# Patient Record
Sex: FEMALE | Race: WHITE | NOT HISPANIC OR LATINO | URBAN - METROPOLITAN AREA
[De-identification: names, ages, dates, MRNs, and addresses within clinical notes are randomized per-mention and may not be internally consistent; named-entity substitution may affect disease eponyms.]

---

## 2024-09-25 ENCOUNTER — INITIAL PRENATAL (OUTPATIENT)
Dept: OBSTETRICS AND GYNECOLOGY | Facility: CLINIC | Age: 29
End: 2024-09-25
Payer: OTHER GOVERNMENT

## 2024-09-25 VITALS — WEIGHT: 160 LBS | DIASTOLIC BLOOD PRESSURE: 60 MMHG | SYSTOLIC BLOOD PRESSURE: 126 MMHG

## 2024-09-25 DIAGNOSIS — Z34.90 PREGNANCY AT EARLY STAGE (HHS-HCC): Primary | ICD-10-CM

## 2024-09-25 LAB
POC APPEARANCE, URINE: CLEAR
POC BILIRUBIN, URINE: NEGATIVE
POC BLOOD, URINE: ABNORMAL
POC COLOR, URINE: YELLOW
POC GLUCOSE, URINE: NEGATIVE MG/DL
POC KETONES, URINE: ABNORMAL MG/DL
POC LEUKOCYTES, URINE: NEGATIVE
POC NITRITE,URINE: NEGATIVE
POC PH, URINE: 7 PH
POC PROTEIN, URINE: NEGATIVE MG/DL
POC SPECIFIC GRAVITY, URINE: 1.02
POC UROBILINOGEN, URINE: 0.2 EU/DL
PREGNANCY TEST URINE, POC: POSITIVE

## 2024-09-25 PROCEDURE — 81003 URINALYSIS AUTO W/O SCOPE: CPT | Mod: QW | Performed by: STUDENT IN AN ORGANIZED HEALTH CARE EDUCATION/TRAINING PROGRAM

## 2024-09-25 PROCEDURE — 81025 URINE PREGNANCY TEST: CPT | Performed by: STUDENT IN AN ORGANIZED HEALTH CARE EDUCATION/TRAINING PROGRAM

## 2024-09-25 PROCEDURE — 0500F INITIAL PRENATAL CARE VISIT: CPT | Performed by: STUDENT IN AN ORGANIZED HEALTH CARE EDUCATION/TRAINING PROGRAM

## 2024-09-25 ASSESSMENT — EDINBURGH POSTNATAL DEPRESSION SCALE (EPDS)
I HAVE BLAMED MYSELF UNNECESSARILY WHEN THINGS WENT WRONG: NO, NEVER
I HAVE FELT SCARED OR PANICKY FOR NO GOOD REASON: NO, NOT AT ALL
I HAVE BEEN ANXIOUS OR WORRIED FOR NO GOOD REASON: NO, NOT AT ALL
I HAVE BEEN SO UNHAPPY THAT I HAVE HAD DIFFICULTY SLEEPING: NOT AT ALL
I HAVE BEEN ABLE TO LAUGH AND SEE THE FUNNY SIDE OF THINGS: AS MUCH AS I ALWAYS COULD
TOTAL SCORE: 0
THINGS HAVE BEEN GETTING ON TOP OF ME: NO, I HAVE BEEN COPING AS WELL AS EVER
THE THOUGHT OF HARMING MYSELF HAS OCCURRED TO ME: NEVER
I HAVE FELT SAD OR MISERABLE: NO, NOT AT ALL
I HAVE BEEN SO UNHAPPY THAT I HAVE BEEN CRYING: NO, NEVER
I HAVE LOOKED FORWARD WITH ENJOYMENT TO THINGS: AS MUCH AS I EVER DID

## 2024-09-25 ASSESSMENT — ENCOUNTER SYMPTOMS
LOSS OF SENSATION IN FEET: 0
DEPRESSION: 0
OCCASIONAL FEELINGS OF UNSTEADINESS: 0

## 2024-09-25 ASSESSMENT — PAIN SCALES - GENERAL: PAINLEVEL_OUTOF10: 0 - NO PAIN

## 2024-09-25 ASSESSMENT — PAIN - FUNCTIONAL ASSESSMENT: PAIN_FUNCTIONAL_ASSESSMENT: 0-10

## 2024-09-25 ASSESSMENT — PATIENT HEALTH QUESTIONNAIRE - PHQ9
1. LITTLE INTEREST OR PLEASURE IN DOING THINGS: NOT AT ALL
2. FEELING DOWN, DEPRESSED OR HOPELESS: NOT AT ALL
SUM OF ALL RESPONSES TO PHQ9 QUESTIONS 1 & 2: 0

## 2024-09-25 NOTE — PROGRESS NOTES
"Pregnancy Confirmation Visit   Subjective   Patient ID [id number]   Saleem Jo \"Rebecca\" is a 29 y.o.  4w1d, 6/3/2025, by today's bedside ultrasound. She is unsure about her LMP but thinks it may be the end of July.     She denies any vaginal bleeding or cramping.    OB History    Para Term  AB Living   1 0 0 0 0 0   SAB IAB Ectopic Multiple Live Births   0 0 0 0 0      # Outcome Date GA Lbr Johnnie/2nd Weight Sex Type Anes PTL Lv   1 Current              Shel[place]  Depression Scale Total: 0    History reviewed. No pertinent past medical history.    History reviewed. No pertinent surgical history.    Family History   Problem Relation Name Age of Onset    Hypertension Mother Genie     Diabetes Father      Breast cancer Father's Sister Clovis     Vision loss Maternal Grandmother Charley     Breast cancer Paternal Grandmother Zohreh         Current Outpatient Medications   Medication Instructions    prenatal no115/iron/folic acid (PRENATAL 19 ORAL) oral        No Known Allergies    Objective   Vitals  Visit Vitals  /60     Weight: 72.6 kg (160 lb)  Pregravid BMI: Could not be calculated  BP: 126/60        Physical Exam  General: Alert and oriented, in no acute distress.  Psych: Normal affect and mood. Able to answer questions appropriately.     PROCEDURE:  OB/GYN Transvaginal U/S    Intrauterine - Yes  Gestational sac- Yes, measuring 11.2 mm corresponding to a gestational age of 4w1d.  Yolk Sac - Yes  Fetal Pole- Not yet seen    Assessment/Plan     29 y.o.  at 4w1d with UGO 6/3/2025, by today's bedside ultrasound    #Early pregnancy  -Bedside ultrasound today showed an intrauterine gestational sac and yolk sac without the presence of a fetal pole  -Reviewed findings with patient and discussed that this can be a normal finding given how early the pregnancy is vs an abnormal finding  -Recommended for her to return in 11 days for a repeat ultrasound and we discussed that we should be " able to see a fetal pole at that time if it is a normal pregnancy  -Patient voiced understanding and is in agreement with this plan of care  -Patient to return for repeat bedside transvaginal ultrasound in 11 days for further evaluation of this pregnancy    Alonzo Brunner MD

## 2024-10-09 NOTE — PROGRESS NOTES
"INITIAL OB VISIT  Subjective   Patient ID [id number]   Saleem Jo \"Berenice" is a 29 y.o.  at 7w3d, UGO 2025, by 1st trimester bedside ultrasound who presents for an initial prenatal visit.     Notably, she was seen 24 with an unsure LMP of 24 and her bedside US at that time showed an intrauterine gestational sac and yolk sac without the presence of a fetal pole (likely due to being too early in the pregnancy). Since that time, she has been doing well and reports no issues. She denies any significant abdominal cramping, nausea, or vomiting. Denies any vaginal bleeding.     OB History    Para Term  AB Living   1 0 0 0 0 0   SAB IAB Ectopic Multiple Live Births   0 0 0 0 0      # Outcome Date GA Lbr Johnnie/2nd Weight Sex Type Anes PTL Lv   1 Current                 Past Medical History:   Diagnosis Date    Anxiety      History reviewed. No pertinent surgical history.    Family History   Problem Relation Name Age of Onset    Hypertension Mother Genie     Diabetes Father      Breast cancer Father's Sister Clovis     Vision loss Maternal Grandmother Charley     Breast cancer Paternal Grandmother Zohreh         Current Outpatient Medications   Medication Instructions    prenatal no115/iron/folic acid (PRENATAL 19 ORAL) oral      No Known Allergies    Objective   Vitals  Visit Vitals  /62     Weight: 74 kg (163 lb 3.2 oz)  Pregravid BMI: Could not be calculated  BP: 118/62        Physical Exam  General: Alert and oriented, in no acute distress.  Psych: Normal affect and mood. Able to answer questions appropriately.   Heart: Regular rate.  Lungs: Non labored respirations.    PROCEDURE:  OB/GYN Transvaginal U/S  Intrauterine - yes  Yolk Sac - yes  Fetal Pole- single  FHT  158 bpm  EDC  24  CRL  12.5 mm, corresponding to a gestational age of 7w3d     Prenatal Labs  Results for orders placed or performed in visit on 24   POCT UA Automated manually resulted   Result Value Ref Range "    POC Color, Urine Yellow Straw, Yellow, Light-Yellow    POC Appearance, Urine Clear Clear    POC Glucose, Urine NEGATIVE NEGATIVE mg/dl    POC Bilirubin, Urine NEGATIVE NEGATIVE    POC Ketones, Urine TRACE (A) NEGATIVE mg/dl    POC Specific Gravity, Urine 1.025 1.005 - 1.035    POC Blood, Urine TRACE-Lysed (A) NEGATIVE    POC PH, Urine 7.0 No Reference Range Established PH    POC Protein, Urine NEGATIVE NEGATIVE, 30 (1+) mg/dl    POC Urobilinogen, Urine 0.2 0.2, 1.0 EU/DL    Poc Nitrite, Urine NEGATIVE NEGATIVE    POC Leukocytes, Urine NEGATIVE NEGATIVE   POCT pregnancy, urine manually resulted   Result Value Ref Range    Preg Test, Ur Positive (A) Negative     Assessment/Plan     29 y.o.  at 7w3d with UGO 2025, by Ultrasound, initial OB visit     Assessment & Plan  Prenatal care in first trimester ([facility])  -Initial OB visit with a live IUP  -Oriented to the practice, discussed general pregnancy education, given new OB welcome bag  -Prenatal labs ordered as detailed below   -Genetic Screening/first trimester screening and second trimester screening discussed, desires to have this done, order for Prequel screening placed  -Immunizations: discussed Flu, COVID, and RSV, Tdap in the third trimester  -Daily prenatal vitamins already taking  -Follow up in 4 weeks for return OB visit or sooner as clinically indicated     Orders:    CBC Anemia Panel With Reflex; Future    Hep B surface Ag; Future    Hep C Ab; Future    HIV 1/2 Screen with Reflex; Future    Hgb  A1C; Future    HEMOGLOBIN IDENTIFICATION WITH PATH REVIEW; Future    Rubella Ab, IgG; Future    Syphilis Screen with Reflex; Future    Type And Screen; Future    Urine Culture clinic collect; Future    Urine GC / Chlam clinic collect; Future    [facility] Prequel Prenatal screen; Future    Follow Up 4 weeks; Future    [facility] Prequel Prenatal screen      Alonzo Brunner MD

## 2024-10-10 ENCOUNTER — INITIAL PRENATAL (OUTPATIENT)
Dept: OBSTETRICS AND GYNECOLOGY | Facility: CLINIC | Age: 29
End: 2024-10-10
Payer: OTHER GOVERNMENT

## 2024-10-10 VITALS — DIASTOLIC BLOOD PRESSURE: 62 MMHG | SYSTOLIC BLOOD PRESSURE: 118 MMHG | WEIGHT: 163.2 LBS

## 2024-10-10 DIAGNOSIS — Z34.90 PREGNANCY AT EARLY STAGE (HHS-HCC): ICD-10-CM

## 2024-10-10 DIAGNOSIS — Z34.91 PRENATAL CARE IN FIRST TRIMESTER (HHS-HCC): Primary | ICD-10-CM

## 2024-10-10 LAB
POC APPEARANCE, URINE: CLEAR
POC BILIRUBIN, URINE: NEGATIVE
POC BLOOD, URINE: NEGATIVE
POC COLOR, URINE: YELLOW
POC GLUCOSE, URINE: NEGATIVE MG/DL
POC KETONES, URINE: NEGATIVE MG/DL
POC LEUKOCYTES, URINE: NEGATIVE
POC NITRITE,URINE: NEGATIVE
POC PH, URINE: 6.5 PH
POC PROTEIN, URINE: NEGATIVE MG/DL
POC SPECIFIC GRAVITY, URINE: 1.02
POC UROBILINOGEN, URINE: 0.2 EU/DL

## 2024-10-10 PROCEDURE — 81003 URINALYSIS AUTO W/O SCOPE: CPT | Performed by: STUDENT IN AN ORGANIZED HEALTH CARE EDUCATION/TRAINING PROGRAM

## 2024-10-10 PROCEDURE — 0500F INITIAL PRENATAL CARE VISIT: CPT | Performed by: STUDENT IN AN ORGANIZED HEALTH CARE EDUCATION/TRAINING PROGRAM

## 2024-10-10 ASSESSMENT — PATIENT HEALTH QUESTIONNAIRE - PHQ9
1. LITTLE INTEREST OR PLEASURE IN DOING THINGS: NOT AT ALL
SUM OF ALL RESPONSES TO PHQ9 QUESTIONS 1 & 2: 0
2. FEELING DOWN, DEPRESSED OR HOPELESS: NOT AT ALL

## 2024-10-10 ASSESSMENT — PAIN SCALES - GENERAL: PAINLEVEL_OUTOF10: 0 - NO PAIN

## 2024-10-10 ASSESSMENT — ENCOUNTER SYMPTOMS
DEPRESSION: 0
LOSS OF SENSATION IN FEET: 0
OCCASIONAL FEELINGS OF UNSTEADINESS: 0

## 2024-10-10 ASSESSMENT — PAIN - FUNCTIONAL ASSESSMENT: PAIN_FUNCTIONAL_ASSESSMENT: 0-10

## 2024-10-15 PROCEDURE — 87491 CHLMYD TRACH DNA AMP PROBE: CPT | Mod: WESLAB | Performed by: STUDENT IN AN ORGANIZED HEALTH CARE EDUCATION/TRAINING PROGRAM

## 2024-10-15 PROCEDURE — 87086 URINE CULTURE/COLONY COUNT: CPT | Mod: WESLAB | Performed by: STUDENT IN AN ORGANIZED HEALTH CARE EDUCATION/TRAINING PROGRAM

## 2024-10-16 LAB
C TRACH RRNA SPEC QL NAA+PROBE: NEGATIVE
N GONORRHOEA DNA SPEC QL PROBE+SIG AMP: NEGATIVE

## 2024-10-17 LAB — BACTERIA UR CULT: NO GROWTH

## 2024-10-29 ENCOUNTER — LAB (OUTPATIENT)
Dept: LAB | Facility: LAB | Age: 29
End: 2024-10-29
Payer: OTHER GOVERNMENT

## 2024-10-29 DIAGNOSIS — Z34.91 PRENATAL CARE IN FIRST TRIMESTER (HHS-HCC): ICD-10-CM

## 2024-10-29 LAB
ABO GROUP (TYPE) IN BLOOD: NORMAL
ANTIBODY SCREEN: NORMAL
ERYTHROCYTE [DISTWIDTH] IN BLOOD BY AUTOMATED COUNT: 12.8 % (ref 11.5–14.5)
EST. AVERAGE GLUCOSE BLD GHB EST-MCNC: 100 MG/DL
HBA1C MFR BLD: 5.1 %
HBV SURFACE AG SERPL QL IA: NONREACTIVE
HCT VFR BLD AUTO: 35.3 % (ref 36–46)
HCV AB SER QL: NONREACTIVE
HGB BLD-MCNC: 12.4 G/DL (ref 12–16)
HIV 1+2 AB+HIV1 P24 AG SERPL QL IA: NONREACTIVE
MCH RBC QN AUTO: 30.3 PG (ref 26–34)
MCHC RBC AUTO-ENTMCNC: 35.1 G/DL (ref 32–36)
MCV RBC AUTO: 86 FL (ref 80–100)
NRBC BLD-RTO: 0 /100 WBCS (ref 0–0)
PLATELET # BLD AUTO: 225 X10*3/UL (ref 150–450)
RBC # BLD AUTO: 4.09 X10*6/UL (ref 4–5.2)
REFLEX ADDED, ANEMIA PANEL: NORMAL
RH FACTOR (ANTIGEN D): NORMAL
RUBV IGG SERPL IA-ACNC: 2.2 IA
RUBV IGG SERPL QL IA: POSITIVE
TREPONEMA PALLIDUM IGG+IGM AB [PRESENCE] IN SERUM OR PLASMA BY IMMUNOASSAY: NONREACTIVE
WBC # BLD AUTO: 9.1 X10*3/UL (ref 4.4–11.3)

## 2024-10-29 PROCEDURE — 85027 COMPLETE CBC AUTOMATED: CPT

## 2024-10-29 PROCEDURE — 86317 IMMUNOASSAY INFECTIOUS AGENT: CPT

## 2024-10-29 PROCEDURE — 83036 HEMOGLOBIN GLYCOSYLATED A1C: CPT

## 2024-10-29 PROCEDURE — 87340 HEPATITIS B SURFACE AG IA: CPT

## 2024-10-29 PROCEDURE — 86901 BLOOD TYPING SEROLOGIC RH(D): CPT

## 2024-10-29 PROCEDURE — 86803 HEPATITIS C AB TEST: CPT

## 2024-10-29 PROCEDURE — 86850 RBC ANTIBODY SCREEN: CPT

## 2024-10-29 PROCEDURE — 87389 HIV-1 AG W/HIV-1&-2 AB AG IA: CPT

## 2024-10-29 PROCEDURE — 86780 TREPONEMA PALLIDUM: CPT

## 2024-10-29 PROCEDURE — 36415 COLL VENOUS BLD VENIPUNCTURE: CPT

## 2024-10-29 PROCEDURE — 86900 BLOOD TYPING SEROLOGIC ABO: CPT

## 2024-10-29 PROCEDURE — 83021 HEMOGLOBIN CHROMOTOGRAPHY: CPT

## 2024-10-29 PROCEDURE — 83020 HEMOGLOBIN ELECTROPHORESIS: CPT | Performed by: STUDENT IN AN ORGANIZED HEALTH CARE EDUCATION/TRAINING PROGRAM

## 2024-10-30 LAB
HEMOGLOBIN A2: 2.9 % (ref 2–3.5)
HEMOGLOBIN A: 96.7 % (ref 95.8–98)
HEMOGLOBIN F: 0.4 % (ref 0–2)
HEMOGLOBIN IDENTIFICATION INTERPRETATION: NORMAL
PATH REVIEW-HGB IDENTIFICATION: NORMAL

## 2024-10-31 ENCOUNTER — PATIENT MESSAGE (OUTPATIENT)
Dept: OBSTETRICS AND GYNECOLOGY | Facility: CLINIC | Age: 29
End: 2024-10-31

## 2024-10-31 LAB
COMMENTS - MP RESULT TYPE: NORMAL
SCAN RESULT: NORMAL

## 2024-11-11 ENCOUNTER — ROUTINE PRENATAL (OUTPATIENT)
Dept: OBSTETRICS AND GYNECOLOGY | Facility: CLINIC | Age: 29
End: 2024-11-11
Payer: OTHER GOVERNMENT

## 2024-11-11 VITALS
BODY MASS INDEX: 27.94 KG/M2 | DIASTOLIC BLOOD PRESSURE: 72 MMHG | WEIGHT: 178 LBS | SYSTOLIC BLOOD PRESSURE: 128 MMHG | HEIGHT: 67 IN

## 2024-11-11 DIAGNOSIS — Z3A.12 12 WEEKS GESTATION OF PREGNANCY (HHS-HCC): ICD-10-CM

## 2024-11-11 DIAGNOSIS — Z34.91 PRENATAL CARE IN FIRST TRIMESTER (HHS-HCC): ICD-10-CM

## 2024-11-11 LAB
POC APPEARANCE, URINE: CLEAR
POC BILIRUBIN, URINE: NEGATIVE
POC BLOOD, URINE: NEGATIVE
POC COLOR, URINE: YELLOW
POC GLUCOSE, URINE: NEGATIVE MG/DL
POC KETONES, URINE: NEGATIVE MG/DL
POC LEUKOCYTES, URINE: NEGATIVE
POC NITRITE,URINE: NEGATIVE
POC PH, URINE: 5 PH
POC PROTEIN, URINE: NEGATIVE MG/DL
POC SPECIFIC GRAVITY, URINE: 1.02
POC UROBILINOGEN, URINE: 0.2 EU/DL

## 2024-11-11 PROCEDURE — 0501F PRENATAL FLOW SHEET: CPT | Performed by: OBSTETRICS & GYNECOLOGY

## 2024-11-11 PROCEDURE — 81002 URINALYSIS NONAUTO W/O SCOPE: CPT | Performed by: OBSTETRICS & GYNECOLOGY

## 2024-11-11 ASSESSMENT — PAIN SCALES - GENERAL: PAINLEVEL_OUTOF10: 0 - NO PAIN

## 2024-11-11 ASSESSMENT — PAIN - FUNCTIONAL ASSESSMENT: PAIN_FUNCTIONAL_ASSESSMENT: 0-10

## 2024-11-11 ASSESSMENT — ENCOUNTER SYMPTOMS
OCCASIONAL FEELINGS OF UNSTEADINESS: 0
DEPRESSION: 0
LOSS OF SENSATION IN FEET: 0

## 2024-11-11 ASSESSMENT — PATIENT HEALTH QUESTIONNAIRE - PHQ9
2. FEELING DOWN, DEPRESSED OR HOPELESS: NOT AT ALL
SUM OF ALL RESPONSES TO PHQ9 QUESTIONS 1 AND 2: 0
1. LITTLE INTEREST OR PLEASURE IN DOING THINGS: NOT AT ALL

## 2024-11-11 NOTE — PROGRESS NOTES
"Needs ftas/nt. .      Ob Visit  24     SUBJECTIVE      HPI: Saleem Jo \"Rebecca\" is a 29 y.o.  at 12w6d here for RPNV.  She has no contractions, bleeding, or LOF. Reports no normal fetal movement yet this pregnancy. Patient reports no complaints.       OBJECTIVE  Visit Vitals  /72   Ht 1.689 m (5' 6.5\")   Wt 80.7 kg (178 lb)   LMP 2024 (Approximate)   BMI 28.30 kg/m²   OB Status Pregnant   Smoking Status Never   BSA 1.95 m²            ASSESSMENT & PLAN    Saleem Jo \"Rebecca\" is a 29 y.o.  at 12w0d here for the following concerns we addressed today:    Problem List Items Addressed This Visit       12 weeks gestation of pregnancy ([facility])    Overview     Desired provider in labor: [] CNM  [] Physician  [] Blood Products: [x] Yes, accepts [] No, needs counseling  [x] Initial BMI: 25.44   [] Prenatal Labs:   [] Cervical Cancer Screening up to date  [] Rh status:   [] Genetic Screening:    [] NT US: (11-13 wks)  [] Baby ASA (if indicated):  [] Pregnancy dated by:     [] Anatomy US: (19-20 wks)  [] Federal Sterilization consent signed (if indicated):  [] 1hr GCT at 24-28wks:  [] Rhogam (if indicated):   [] Fetal Surveillance (if indicated):  [] Tdap (27-32 wks, may be given up to 36 wks if initial window missed):   [] RSV (32-36 wks) (Sept. to end of ):   [] Flu Vaccine:    [] Breastfeeding:  [] Postpartum Birth control method:   [] GBS at 36 - 37 wks:  [] 39 weeks discussion of IOL vs. Expectant management:  [] Mode of delivery ( anticipated ):           Relevant Orders    POCT UA (nonautomated) manually resulted (Completed)    US MAC OB imaging order     Other Visit Diagnoses       Prenatal care in first trimester ([facility])        Relevant Orders    US MAC OB imaging order              RTC in 4 weeks      Aurora Loyd, DO      "

## 2024-11-12 NOTE — PROGRESS NOTES
"Subjective   Patient ID [id number]   Saleem Jo \"Berenice" is a 29 y.o.  at 12w2d with a working estimated date of delivery of 2025, by Ultrasound who presents for a routine prenatal visit. She is doing well when seen today. Denies vaginal bleeding or pelvic cramping.    Her pregnancy is complicated by:  1.Anxiety: Well controlled without meds     Objective   Physical Exam  Weight: 80 kg (176 lb 6.4 oz), Pregravid BMI: 25.44  BP: 120/76  Fetal Heart Rate: 149             Prenatal Labs  Urine dip:  Results for orders placed or performed in visit on 24   POCT UA Automated manually resulted    Collection Time: 24  4:13 PM   Result Value Ref Range    POC Color, Urine Yellow Straw, Yellow, Light-Yellow    POC Appearance, Urine Clear Clear    POC Glucose, Urine NEGATIVE NEGATIVE mg/dl    POC Bilirubin, Urine NEGATIVE NEGATIVE    POC Ketones, Urine NEGATIVE NEGATIVE mg/dl    POC Specific Gravity, Urine 1.025 1.005 - 1.035    POC Blood, Urine TRACE-Intact (A) NEGATIVE    POC PH, Urine 6.0 No Reference Range Established PH    POC Protein, Urine NEGATIVE NEGATIVE, 30 (1+) mg/dl    POC Urobilinogen, Urine 0.2 0.2, 1.0 EU/DL    Poc Nitrite, Urine NEGATIVE NEGATIVE    POC Leukocytes, Urine NEGATIVE NEGATIVE     Assessment/Plan     29 y.o.  at 12w2d, LANCE   Assessment & Plan  Prenatal care in first trimester ([facility])  -Continue prenatal vitamin.  -NOB labs reviewed and discussed that they are normal.  -Discussed NT scheduled for 24  -Discussed vaccines: [facility]  -Return precautions reviewed thoroughly.  -Follow up in 4 weeks for a routine prenatal visit.  Orders:    POCT UA Automated manually resulted    12 weeks gestation of pregnancy ([facility])         Alonzo Brunner MD  "

## 2024-11-13 ENCOUNTER — ROUTINE PRENATAL (OUTPATIENT)
Dept: OBSTETRICS AND GYNECOLOGY | Facility: CLINIC | Age: 29
End: 2024-11-13
Payer: OTHER GOVERNMENT

## 2024-11-13 VITALS — BODY MASS INDEX: 28.05 KG/M2 | DIASTOLIC BLOOD PRESSURE: 76 MMHG | SYSTOLIC BLOOD PRESSURE: 120 MMHG | WEIGHT: 176.4 LBS

## 2024-11-13 DIAGNOSIS — Z34.91 PRENATAL CARE IN FIRST TRIMESTER (HHS-HCC): Primary | ICD-10-CM

## 2024-11-13 DIAGNOSIS — Z3A.12 12 WEEKS GESTATION OF PREGNANCY (HHS-HCC): ICD-10-CM

## 2024-11-13 LAB
POC APPEARANCE, URINE: CLEAR
POC BILIRUBIN, URINE: NEGATIVE
POC BLOOD, URINE: ABNORMAL
POC COLOR, URINE: YELLOW
POC GLUCOSE, URINE: NEGATIVE MG/DL
POC KETONES, URINE: NEGATIVE MG/DL
POC LEUKOCYTES, URINE: NEGATIVE
POC NITRITE,URINE: NEGATIVE
POC PH, URINE: 6 PH
POC PROTEIN, URINE: NEGATIVE MG/DL
POC SPECIFIC GRAVITY, URINE: 1.02
POC UROBILINOGEN, URINE: 0.2 EU/DL

## 2024-11-13 PROCEDURE — 0501F PRENATAL FLOW SHEET: CPT | Performed by: STUDENT IN AN ORGANIZED HEALTH CARE EDUCATION/TRAINING PROGRAM

## 2024-11-13 PROCEDURE — 81003 URINALYSIS AUTO W/O SCOPE: CPT | Mod: QW | Performed by: STUDENT IN AN ORGANIZED HEALTH CARE EDUCATION/TRAINING PROGRAM

## 2024-11-13 ASSESSMENT — ENCOUNTER SYMPTOMS
DEPRESSION: 0
LOSS OF SENSATION IN FEET: 0
OCCASIONAL FEELINGS OF UNSTEADINESS: 0

## 2024-11-13 ASSESSMENT — PAIN - FUNCTIONAL ASSESSMENT: PAIN_FUNCTIONAL_ASSESSMENT: 0-10

## 2024-11-13 ASSESSMENT — PATIENT HEALTH QUESTIONNAIRE - PHQ9
SUM OF ALL RESPONSES TO PHQ9 QUESTIONS 1 & 2: 0
2. FEELING DOWN, DEPRESSED OR HOPELESS: NOT AT ALL
1. LITTLE INTEREST OR PLEASURE IN DOING THINGS: NOT AT ALL

## 2024-11-13 ASSESSMENT — PAIN SCALES - GENERAL: PAINLEVEL_OUTOF10: 0 - NO PAIN

## 2024-11-18 ENCOUNTER — HOSPITAL ENCOUNTER (OUTPATIENT)
Dept: RADIOLOGY | Facility: CLINIC | Age: 29
Discharge: HOME | End: 2024-11-18
Payer: OTHER GOVERNMENT

## 2024-11-18 DIAGNOSIS — Z3A.12 12 WEEKS GESTATION OF PREGNANCY (HHS-HCC): ICD-10-CM

## 2024-11-18 DIAGNOSIS — Z34.91 PRENATAL CARE IN FIRST TRIMESTER (HHS-HCC): ICD-10-CM

## 2024-11-18 PROCEDURE — 76801 OB US < 14 WKS SINGLE FETUS: CPT | Performed by: OBSTETRICS & GYNECOLOGY

## 2024-11-18 PROCEDURE — 76801 OB US < 14 WKS SINGLE FETUS: CPT

## 2024-12-09 ENCOUNTER — ROUTINE PRENATAL (OUTPATIENT)
Dept: OBSTETRICS AND GYNECOLOGY | Facility: CLINIC | Age: 29
End: 2024-12-09
Payer: OTHER GOVERNMENT

## 2024-12-09 VITALS — WEIGHT: 190.8 LBS | BODY MASS INDEX: 30.33 KG/M2 | SYSTOLIC BLOOD PRESSURE: 124 MMHG | DIASTOLIC BLOOD PRESSURE: 66 MMHG

## 2024-12-09 DIAGNOSIS — Z34.92 PRENATAL CARE IN SECOND TRIMESTER (HHS-HCC): Primary | ICD-10-CM

## 2024-12-09 DIAGNOSIS — Z3A.16 16 WEEKS GESTATION OF PREGNANCY (HHS-HCC): ICD-10-CM

## 2024-12-09 LAB
POC APPEARANCE, URINE: CLEAR
POC BILIRUBIN, URINE: NEGATIVE
POC BLOOD, URINE: NEGATIVE
POC COLOR, URINE: YELLOW
POC GLUCOSE, URINE: NEGATIVE MG/DL
POC KETONES, URINE: NEGATIVE MG/DL
POC LEUKOCYTES, URINE: NEGATIVE
POC NITRITE,URINE: NEGATIVE
POC PH, URINE: 7 PH
POC PROTEIN, URINE: NEGATIVE MG/DL
POC SPECIFIC GRAVITY, URINE: 1.02
POC UROBILINOGEN, URINE: 0.2 EU/DL

## 2024-12-09 PROCEDURE — 81003 URINALYSIS AUTO W/O SCOPE: CPT | Mod: QW | Performed by: STUDENT IN AN ORGANIZED HEALTH CARE EDUCATION/TRAINING PROGRAM

## 2024-12-09 PROCEDURE — 0501F PRENATAL FLOW SHEET: CPT | Performed by: STUDENT IN AN ORGANIZED HEALTH CARE EDUCATION/TRAINING PROGRAM

## 2024-12-09 RX ORDER — NAPROXEN SODIUM 220 MG/1
81 TABLET, FILM COATED ORAL 2 TIMES DAILY
Qty: 300 TABLET | Refills: 0 | Status: SHIPPED | OUTPATIENT
Start: 2024-12-09 | End: 2024-12-09

## 2024-12-09 RX ORDER — NAPROXEN SODIUM 220 MG/1
81 TABLET, FILM COATED ORAL 2 TIMES DAILY
Qty: 300 TABLET | Refills: 0 | Status: SHIPPED | OUTPATIENT
Start: 2024-12-09 | End: 2025-05-08

## 2024-12-09 ASSESSMENT — PATIENT HEALTH QUESTIONNAIRE - PHQ9
1. LITTLE INTEREST OR PLEASURE IN DOING THINGS: NOT AT ALL
SUM OF ALL RESPONSES TO PHQ9 QUESTIONS 1 & 2: 0
2. FEELING DOWN, DEPRESSED OR HOPELESS: NOT AT ALL
2. FEELING DOWN, DEPRESSED OR HOPELESS: NOT AT ALL
SUM OF ALL RESPONSES TO PHQ9 QUESTIONS 1 & 2: 0
1. LITTLE INTEREST OR PLEASURE IN DOING THINGS: NOT AT ALL

## 2024-12-09 ASSESSMENT — PAIN - FUNCTIONAL ASSESSMENT: PAIN_FUNCTIONAL_ASSESSMENT: 0-10

## 2024-12-09 ASSESSMENT — PAIN SCALES - GENERAL: PAINLEVEL_OUTOF10: 0 - NO PAIN

## 2024-12-09 ASSESSMENT — ENCOUNTER SYMPTOMS
OCCASIONAL FEELINGS OF UNSTEADINESS: 0
DEPRESSION: 0
LOSS OF SENSATION IN FEET: 0

## 2024-12-09 NOTE — PROGRESS NOTES
"Subjective   Patient ID [id number]   Saleem Jo \"Berenice" is a 29 y.o.  at 16w0d with a working estimated date of delivery of 2025, by Ultrasound who presents for a routine prenatal visit. Doing overall well today, has had some headaches that improve after her drinking Sprite.     Denies bleeding or cramping.     Her pregnancy is complicated by:  Anxiety: Well controlled without meds     Objective   Physical Exam  Weight: 86.5 kg (190 lb 12.8 oz)  Pregravid BMI: 25.44  BP: 124/66  Fetal Heart Rate: 134             Prenatal Labs  Urine dip:  Results for orders placed or performed in visit on 24   POCT UA Automated manually resulted    Collection Time: 24  4:14 PM   Result Value Ref Range    POC Color, Urine Yellow Straw, Yellow, Light-Yellow    POC Appearance, Urine Clear Clear    POC Glucose, Urine NEGATIVE NEGATIVE mg/dl    POC Bilirubin, Urine NEGATIVE NEGATIVE    POC Ketones, Urine NEGATIVE NEGATIVE mg/dl    POC Specific Gravity, Urine 1.020 1.005 - 1.035    POC Blood, Urine NEGATIVE NEGATIVE    POC PH, Urine 7.0 No Reference Range Established PH    POC Protein, Urine NEGATIVE NEGATIVE, 30 (1+) mg/dl    POC Urobilinogen, Urine 0.2 0.2, 1.0 EU/DL    Poc Nitrite, Urine NEGATIVE NEGATIVE    POC Leukocytes, Urine NEGATIVE NEGATIVE     Assessment/Plan     29 y.o.  at 16w0d , LANCE  Assessment & Plan  Prenatal care in second trimester ([facility])  -Continue prenatal vitamin.  -Discussed starting bASA.  -NT reviewed.   -Anatomy ultrasound scheduled for 24.  -Return precautions reviewed thoroughly.   -Follow up in 4 weeks for a routine prenatal visit.  Orders:    POCT UA Automated manually resulted    aspirin 81 mg chewable tablet; Chew 1 tablet (81 mg) 2 times a day.    16 weeks gestation of pregnancy ([facility])           Alonzo Brunner MD  "

## 2024-12-11 ENCOUNTER — APPOINTMENT (OUTPATIENT)
Dept: OBSTETRICS AND GYNECOLOGY | Facility: CLINIC | Age: 29
End: 2024-12-11
Payer: OTHER GOVERNMENT

## 2024-12-31 ENCOUNTER — HOSPITAL ENCOUNTER (OUTPATIENT)
Dept: RADIOLOGY | Facility: CLINIC | Age: 29
Discharge: HOME | End: 2024-12-31
Payer: OTHER GOVERNMENT

## 2024-12-31 DIAGNOSIS — O35.9XX0 MATERNAL CARE FOR (SUSPECTED) FETAL ABNORMALITY AND DAMAGE, UNSPECIFIED, NOT APPLICABLE OR UNSPECIFIED: ICD-10-CM

## 2024-12-31 DIAGNOSIS — Z84.81 FAMILY HISTORY OF CARRIER OF HEREDITARY DISEASE: ICD-10-CM

## 2024-12-31 DIAGNOSIS — Z34.91 PRENATAL CARE IN FIRST TRIMESTER (HHS-HCC): ICD-10-CM

## 2024-12-31 DIAGNOSIS — Z3A.12 12 WEEKS GESTATION OF PREGNANCY (HHS-HCC): ICD-10-CM

## 2024-12-31 PROCEDURE — 76811 OB US DETAILED SNGL FETUS: CPT

## 2024-12-31 PROCEDURE — 76811 OB US DETAILED SNGL FETUS: CPT | Performed by: OBSTETRICS & GYNECOLOGY

## 2025-01-06 ENCOUNTER — TELEPHONE (OUTPATIENT)
Dept: OBSTETRICS AND GYNECOLOGY | Facility: HOSPITAL | Age: 30
End: 2025-01-06
Payer: OTHER GOVERNMENT

## 2025-01-08 ENCOUNTER — APPOINTMENT (OUTPATIENT)
Dept: OBSTETRICS AND GYNECOLOGY | Facility: CLINIC | Age: 30
End: 2025-01-08
Payer: OTHER GOVERNMENT

## 2025-01-16 ENCOUNTER — APPOINTMENT (OUTPATIENT)
Dept: PEDIATRIC CARDIOLOGY | Facility: CLINIC | Age: 30
End: 2025-01-16
Payer: OTHER GOVERNMENT

## 2025-01-16 VITALS
HEART RATE: 84 BPM | SYSTOLIC BLOOD PRESSURE: 119 MMHG | HEIGHT: 67 IN | BODY MASS INDEX: 32.77 KG/M2 | DIASTOLIC BLOOD PRESSURE: 78 MMHG | WEIGHT: 208.78 LBS

## 2025-01-16 DIAGNOSIS — O35.BXX0 ABNORMAL FETAL ECHOCARDIOGRAPHY AFFECTING ANTEPARTUM CARE OF MOTHER, SINGLE OR UNSPECIFIED FETUS (HHS-HCC): Primary | ICD-10-CM

## 2025-01-16 DIAGNOSIS — O28.3 ABNORMAL FETAL ULTRASOUND: ICD-10-CM

## 2025-01-16 DIAGNOSIS — O35.8XX0 MATERNAL CARE FOR OTHER (SUSPECTED) FETAL ABNORMALITY AND DAMAGE, NOT APPLICABLE OR UNSPECIFIED (HHS-HCC): ICD-10-CM

## 2025-01-16 PROCEDURE — 99204 OFFICE O/P NEW MOD 45 MIN: CPT | Performed by: PEDIATRICS

## 2025-01-16 PROCEDURE — 76827 ECHO EXAM OF FETAL HEART: CPT | Performed by: PEDIATRICS

## 2025-01-16 PROCEDURE — 76825 ECHO EXAM OF FETAL HEART: CPT | Performed by: PEDIATRICS

## 2025-01-16 PROCEDURE — 93325 DOPPLER ECHO COLOR FLOW MAPG: CPT | Performed by: PEDIATRICS

## 2025-01-16 PROCEDURE — 3008F BODY MASS INDEX DOCD: CPT | Performed by: PEDIATRICS

## 2025-01-16 NOTE — PROGRESS NOTES
"Saleem Jo was seen at the request of Jade Hamilton for a chief complaint of FOB with history of non-specific cardiomyopathy; a report with my findings is being sent via written or electronic means the referring physician with my recommendations for treatment.     I had the pleasure of seeing Saleem \"Rebecca\" Sandro in Pediatric Cardiology consultation at our [facility] location as part of our prenatal heart program for FOB with history of non-specific cardiomyopathy.  She is a 29 y.o. year-old  woman, currently 21w3d weeks gestation.  Patient's last menstrual period was 2024 (approximate). Estimated Date of Delivery: 25.  There have been no pregnancy complications.   She has not been hospitalized during this pregnancy.  She had a NIPT, which was normal.  She had a second trimester ultrasound which was normal.      Prior to the visit, I personally reviewed the cardiac portions of the obstetrical ultrasound performed on 24.  There is normal segmental anatomy with normal 4 chamber, outflow tract and 3 vessel views.  There is no evidence of septation defect, right or left ventricular outflow obstruction or significant valvular regurgitation.    Her previous obstetrical history is without complication.  Her past medical history is without complication.  She has no history of congenital heart disease, arrhythmia, cardiomyopathy, hypercholesterolemia, hypertension, diabetes, rheumatic heart disease, cancer, asthma, lupus, Sjogren syndrome, clotting disorder, depression, anxiety, alcohol abuse, phenylketonuria, or DiGeorge.  She has had no surgeries.  She is not taking any medications.  She has No Known Allergies..  She is currently taking prenatal vitamins.      Her family history is negative for congenital heart disease, early atherosclerosis, sudden cardiac death, long QT syndrome, cardiomyopathy, aortic aneurysm, or genetic or metabolic disease.  FOB states he has a history of " "non-specific cardiomyopathy but has not seen a cardiologist since college. He was urged to make an appt with a cardiologist soon. She has a cousin that had a heart defect requiring surgery though she is unsure of any details. MONICA's father passed away at <55yrs of age from a stroke.    She currently lives with her spouse and is .  She works as an .  She does not smoke.  She denies illicit drug use or alcohol abuse.  She denies verbal, sexual, or physical abuse.     Delivery Hospital: [facility]  Father of the baby's name: same    /78 (BP Location: Right arm, Patient Position: Sitting)   Pulse 84   Ht 1.71 m (5' 7.32\")   Wt 94.7 kg (208 lb 12.4 oz)   LMP 07/30/2024 (Approximate)   BMI 32.39 kg/m²     She was resting comfortably in the examination room and alert, active and in no respiratory distress. Skin was without rash.  HEENT: moist mucous membranes, no JVD, goiter. Breathing is not labored.  She was acyanotic.  There was no peripheral edema.   The abdomen was gravid, soft, nontender with normal bowel sounds.  The liver was not palpable.  The spleen tip was not palpable.  She had a normal gait and normal strength in all extremities.  Cranial nerves II - XII are intact.  She had no clubbing, cyanosis, or edema.    A two-dimensional and Doppler fetal echocardiogram was performed today and interpreted by me at 21w3d weeks gestation.  The fetal echocardiogram showed normal segmental anatomy with no structural abnormalities found.  There is normal cardiac function.  There is no evidence of septation defect, right or left ventricular outflow obstruction or significant valvular regurgitation.  The fetal heart rate was within normal limits without ectopy or arrhythmia seen.  The spectral Doppler pattern across all valves, venous structures, and arterial structures was within normal limits.  There is no pericardial effusion.  Please see full report for details.    In summary, Saleem \"Rebecca\" " Sandro is a 29 y.o. year-old  woman, currently 21w3d weeks gestation, who had a normal fetal echocardiogram at today's visit.  Therefore, we did not make any changes to her current delivery plan.  We did not prescribe any medications.  We did not recommend intervention.  She does not necessarily need to follow up with pediatric cardiology after the baby is born unless the pediatric team has any concerns or worries.     LOC: 0  Normal fetal echocardiogram within the limitations of ultrasound. No changes were made to current delivery plan. Triage code 0:  Delivery per OB at patient's preferred hospital.  Standard  care per  team.  Cardiology consult not necessary, unless there are clinical concerns.       Scribe’s statement: I, Armida Loomis CNP, am scribing for, and in the presence of, Dr. Donnelly.    Thank you for allowing me to participate in Saleem's care.  If you have any further questions, please do not hesitate to contact me.     Cristiano Donnelly M.D.  [facility], Director  Ambulatory Pediatric Cardiology   Division of Pediatric Cardiology  [facility]  [facility]   of Pediatrics, [employer] [facility] [employer]  [facility] -   [address]  [place] [address]  Office:  [phone]  Fax:       [phone]  e-mail:  [email or web address]    I spent greater than 45 minutes in performance of this consultation, of which greater than 50% was related to coordination of care or counseling.

## 2025-01-16 NOTE — LETTER
"2025     Jade Hamilton MD  [address]  [facility]  [address], [address]  [facility] [address]    Patient: Rebecca Jo   YOB: 1995   Date of Visit: 2025       Dear Dr. Jade Hamilton MD:    Thank you for referring Rebecca Jo to me for evaluation. Below are my notes for this consultation.  If you have questions, please do not hesitate to call me. I look forward to following your patient along with you.       Sincerely,     Cristiano Donnelly MD      CC: Sariah Reynolds, APRN-CNM, DNP  Armida Loomis, APRN-CNP  Alonzo Brunner MD  ______________________________________________________________________________________    Saleem Jo was seen at the request of Jade Hamilton for a chief complaint of FOB with history of non-specific cardiomyopathy; a report with my findings is being sent via written or electronic means the referring physician with my recommendations for treatment.     I had the pleasure of seeing Saleem \"Rebecca\" Sandro in Pediatric Cardiology consultation at our [facility] location as part of our prenatal heart program for FOB with history of non-specific cardiomyopathy.  She is a 29 y.o. year-old  woman, currently 21w3d weeks gestation.  Patient's last menstrual period was 2024 (approximate). Estimated Date of Delivery: 25.  There have been no pregnancy complications.   She has not been hospitalized during this pregnancy.  She had a NIPT, which was normal.  She had a second trimester ultrasound which was normal.      Prior to the visit, I personally reviewed the cardiac portions of the obstetrical ultrasound performed on 24.  There is normal segmental anatomy with normal 4 chamber, outflow tract and 3 vessel views.  There is no evidence of septation defect, right or left ventricular outflow obstruction or significant valvular regurgitation.    Her previous obstetrical history is without complication.  " "Her past medical history is without complication.  She has no history of congenital heart disease, arrhythmia, cardiomyopathy, hypercholesterolemia, hypertension, diabetes, rheumatic heart disease, cancer, asthma, lupus, Sjogren syndrome, clotting disorder, depression, anxiety, alcohol abuse, phenylketonuria, or DiGeorge.  She has had no surgeries.  She is not taking any medications.  She has No Known Allergies..  She is currently taking prenatal vitamins.      Her family history is negative for congenital heart disease, early atherosclerosis, sudden cardiac death, long QT syndrome, cardiomyopathy, aortic aneurysm, or genetic or metabolic disease.  MONICA states he has a history of non-specific cardiomyopathy but has not seen a cardiologist since college. He was urged to make an appt with a cardiologist soon. She has a cousin that had a heart defect requiring surgery though she is unsure of any details. MONICA's father passed away at <55yrs of age from a stroke.    She currently lives with her spouse and is .  She works as an .  She does not smoke.  She denies illicit drug use or alcohol abuse.  She denies verbal, sexual, or physical abuse.     Delivery Hospital: [facility]  Father of the baby's name: same    /78 (BP Location: Right arm, Patient Position: Sitting)   Pulse 84   Ht 1.71 m (5' 7.32\")   Wt 94.7 kg (208 lb 12.4 oz)   LMP 07/30/2024 (Approximate)   BMI 32.39 kg/m²     She was resting comfortably in the examination room and alert, active and in no respiratory distress. Skin was without rash.  HEENT: moist mucous membranes, no JVD, goiter. Breathing is not labored.  She was acyanotic.  There was no peripheral edema.   The abdomen was gravid, soft, nontender with normal bowel sounds.  The liver was not palpable.  The spleen tip was not palpable.  She had a normal gait and normal strength in all extremities.  Cranial nerves II - XII are intact.  She had no clubbing, cyanosis, or " "edema.    A two-dimensional and Doppler fetal echocardiogram was performed today and interpreted by me at 21w3d weeks gestation.  The fetal echocardiogram showed normal segmental anatomy with no structural abnormalities found.  There is normal cardiac function.  There is no evidence of septation defect, right or left ventricular outflow obstruction or significant valvular regurgitation.  The fetal heart rate was within normal limits without ectopy or arrhythmia seen.  The spectral Doppler pattern across all valves, venous structures, and arterial structures was within normal limits.  There is no pericardial effusion.  Please see full report for details.    In summary, Saleem \"Rebecca\" Sandro is a 29 y.o. year-old  woman, currently 21w3d weeks gestation, who had a normal fetal echocardiogram at today's visit.  Therefore, we did not make any changes to her current delivery plan.  We did not prescribe any medications.  We did not recommend intervention.  She does not necessarily need to follow up with pediatric cardiology after the baby is born unless the pediatric team has any concerns or worries.     LOC: 0  Normal fetal echocardiogram within the limitations of ultrasound. No changes were made to current delivery plan. Triage code 0:  Delivery per OB at patient's preferred hospital.  Standard  care per  team.  Cardiology consult not necessary, unless there are clinical concerns.       Scribe’s statement: I, Armida Loomis CNP, am scribing for, and in the presence of, Dr. Donnelly.    Thank you for allowing me to participate in Saleem's care.  If you have any further questions, please do not hesitate to contact me.     Cristiano Donnelly M.D.  [facility], Director  Ambulatory Pediatric Cardiology   Division of Pediatric Cardiology  [facility]  [facility]   of Pediatrics, [facility] " [employer] [facility] [employer]  [facility] [address]  [address]  [place] [id number]  Office:  [phone]  Fax:       [phone]  e-mail:  [email or web address]    I spent greater than 45 minutes in performance of this consultation, of which greater than 50% was related to coordination of care or counseling.

## 2025-02-02 NOTE — PROGRESS NOTES
"Subjective   Patient ID [id number]   Saleem Jo \"Berenice" is a 29 y.o.  at 24w0d with a working estimated date of delivery of 2025, by Ultrasound who presents for a routine prenatal visit.     She is doing well today without complaints. She denies vaginal bleeding, leakage of fluid, decreased fetal movements, or contractions.    Her pregnancy is complicated by:  FOB with history of non specific cardiomyopathy: S/p normal fetal echo, s/p peds cardiology consult, advised standard  care    Objective   Physical Exam  Weight: 98 kg (216 lb)  Pregravid BMI: 24.82  BP: 120/82  Fetal Heart Rate: 135 Fundal Height (cm): 24 cm             Prenatal Labs  Urine dip:  Results for orders placed or performed in visit on 25   POCT UA Automated manually resulted    Collection Time: 25  8:09 AM   Result Value Ref Range    POC Color, Urine Yellow Straw, Yellow, Light-Yellow    POC Appearance, Urine Clear Clear    POC Glucose, Urine NEGATIVE NEGATIVE mg/dl    POC Bilirubin, Urine NEGATIVE NEGATIVE    POC Ketones, Urine NEGATIVE NEGATIVE mg/dl    POC Specific Gravity, Urine 1.020 1.005 - 1.035    POC Blood, Urine NEGATIVE NEGATIVE    POC PH, Urine 7.0 No Reference Range Established PH    POC Protein, Urine NEGATIVE NEGATIVE mg/dl    POC Urobilinogen, Urine 0.2 0.2, 1.0 EU/DL    Poc Nitrite, Urine NEGATIVE NEGATIVE    POC Leukocytes, Urine NEGATIVE NEGATIVE     Assessment/Plan     29 y.o.  at 24w0d , LANCE  Assessment & Plan  Prenatal care in second trimester ([facility])  -Continue prenatal vitamin and bASA  -Discussed normal fetal echocardiogram and recent Peds Cardiology finding   -Discussed 1 hour GCT, CBC next visit   -Return precautions reviewed thoroughly  -Follow up in 4 weeks for a routine prenatal visit  Orders:    POCT UA Automated manually resulted    Glucose, 1 Hour Screen, Pregnancy; Future    CBC Anemia Panel With Reflex, Pregnancy; Future    24 weeks gestation of pregnancy ([facility])     "       Alonzo Brunner MD

## 2025-02-03 ENCOUNTER — ROUTINE PRENATAL (OUTPATIENT)
Dept: OBSTETRICS AND GYNECOLOGY | Facility: CLINIC | Age: 30
End: 2025-02-03
Payer: OTHER GOVERNMENT

## 2025-02-03 VITALS — BODY MASS INDEX: 33.51 KG/M2 | DIASTOLIC BLOOD PRESSURE: 82 MMHG | WEIGHT: 216 LBS | SYSTOLIC BLOOD PRESSURE: 120 MMHG

## 2025-02-03 DIAGNOSIS — Z34.92 PRENATAL CARE IN SECOND TRIMESTER (HHS-HCC): Primary | ICD-10-CM

## 2025-02-03 DIAGNOSIS — Z3A.24 24 WEEKS GESTATION OF PREGNANCY (HHS-HCC): ICD-10-CM

## 2025-02-03 PROCEDURE — 0501F PRENATAL FLOW SHEET: CPT | Performed by: STUDENT IN AN ORGANIZED HEALTH CARE EDUCATION/TRAINING PROGRAM

## 2025-02-03 PROCEDURE — 81003 URINALYSIS AUTO W/O SCOPE: CPT | Mod: QW | Performed by: STUDENT IN AN ORGANIZED HEALTH CARE EDUCATION/TRAINING PROGRAM

## 2025-02-03 ASSESSMENT — PAIN SCALES - GENERAL: PAINLEVEL_OUTOF10: 0 - NO PAIN

## 2025-02-03 ASSESSMENT — PAIN - FUNCTIONAL ASSESSMENT: PAIN_FUNCTIONAL_ASSESSMENT: 0-10

## 2025-02-03 ASSESSMENT — ENCOUNTER SYMPTOMS
OCCASIONAL FEELINGS OF UNSTEADINESS: 0
DEPRESSION: 0
LOSS OF SENSATION IN FEET: 0

## 2025-02-03 ASSESSMENT — PATIENT HEALTH QUESTIONNAIRE - PHQ9
2. FEELING DOWN, DEPRESSED OR HOPELESS: NOT AT ALL
1. LITTLE INTEREST OR PLEASURE IN DOING THINGS: NOT AT ALL
SUM OF ALL RESPONSES TO PHQ9 QUESTIONS 1 & 2: 0

## 2025-03-03 ENCOUNTER — ROUTINE PRENATAL (OUTPATIENT)
Dept: OBSTETRICS AND GYNECOLOGY | Facility: CLINIC | Age: 30
End: 2025-03-03
Payer: OTHER GOVERNMENT

## 2025-03-03 ENCOUNTER — APPOINTMENT (OUTPATIENT)
Dept: LAB | Facility: HOSPITAL | Age: 30
End: 2025-03-03
Payer: OTHER GOVERNMENT

## 2025-03-03 ENCOUNTER — LAB (OUTPATIENT)
Dept: LAB | Facility: HOSPITAL | Age: 30
End: 2025-03-03
Payer: OTHER GOVERNMENT

## 2025-03-03 VITALS — WEIGHT: 231 LBS | BODY MASS INDEX: 35.83 KG/M2 | DIASTOLIC BLOOD PRESSURE: 72 MMHG | SYSTOLIC BLOOD PRESSURE: 130 MMHG

## 2025-03-03 DIAGNOSIS — Z3A.12 12 WEEKS GESTATION OF PREGNANCY (HHS-HCC): ICD-10-CM

## 2025-03-03 DIAGNOSIS — Z34.93 PRENATAL CARE IN THIRD TRIMESTER (HHS-HCC): Primary | ICD-10-CM

## 2025-03-03 DIAGNOSIS — Z3A.28 28 WEEKS GESTATION OF PREGNANCY (HHS-HCC): ICD-10-CM

## 2025-03-03 LAB
ERYTHROCYTE [DISTWIDTH] IN BLOOD BY AUTOMATED COUNT: 13.4 % (ref 11.5–14.5)
HCT VFR BLD AUTO: 34.3 % (ref 36–46)
HGB BLD-MCNC: 11.2 G/DL (ref 12–16)
MCH RBC QN AUTO: 30.1 PG (ref 26–34)
MCHC RBC AUTO-ENTMCNC: 32.7 G/DL (ref 32–36)
MCV RBC AUTO: 92 FL (ref 80–100)
NRBC BLD-RTO: 0 /100 WBCS (ref 0–0)
PLATELET # BLD AUTO: 200 X10*3/UL (ref 150–450)
POC APPEARANCE, URINE: CLEAR
POC BILIRUBIN, URINE: NEGATIVE
POC BLOOD, URINE: NEGATIVE
POC COLOR, URINE: YELLOW
POC GLUCOSE, URINE: NEGATIVE MG/DL
POC KETONES, URINE: NEGATIVE MG/DL
POC LEUKOCYTES, URINE: NEGATIVE
POC NITRITE,URINE: NEGATIVE
POC PH, URINE: 7.5 PH
POC PROTEIN, URINE: NEGATIVE MG/DL
POC SPECIFIC GRAVITY, URINE: 1.01
POC UROBILINOGEN, URINE: 0.2 EU/DL
RBC # BLD AUTO: 3.72 X10*6/UL (ref 4–5.2)
WBC # BLD AUTO: 13 X10*3/UL (ref 4.4–11.3)

## 2025-03-03 PROCEDURE — 81003 URINALYSIS AUTO W/O SCOPE: CPT | Mod: QW | Performed by: STUDENT IN AN ORGANIZED HEALTH CARE EDUCATION/TRAINING PROGRAM

## 2025-03-03 PROCEDURE — 85027 COMPLETE CBC AUTOMATED: CPT

## 2025-03-03 PROCEDURE — 90715 TDAP VACCINE 7 YRS/> IM: CPT | Performed by: STUDENT IN AN ORGANIZED HEALTH CARE EDUCATION/TRAINING PROGRAM

## 2025-03-03 ASSESSMENT — ENCOUNTER SYMPTOMS
OCCASIONAL FEELINGS OF UNSTEADINESS: 0
LOSS OF SENSATION IN FEET: 0
DEPRESSION: 0

## 2025-03-03 ASSESSMENT — PATIENT HEALTH QUESTIONNAIRE - PHQ9
2. FEELING DOWN, DEPRESSED OR HOPELESS: NOT AT ALL
SUM OF ALL RESPONSES TO PHQ9 QUESTIONS 1 & 2: 0
1. LITTLE INTEREST OR PLEASURE IN DOING THINGS: NOT AT ALL
SUM OF ALL RESPONSES TO PHQ9 QUESTIONS 1 & 2: 0
1. LITTLE INTEREST OR PLEASURE IN DOING THINGS: NOT AT ALL
2. FEELING DOWN, DEPRESSED OR HOPELESS: NOT AT ALL

## 2025-03-03 ASSESSMENT — PAIN SCALES - GENERAL: PAINLEVEL_OUTOF10: 0 - NO PAIN

## 2025-03-03 ASSESSMENT — PAIN - FUNCTIONAL ASSESSMENT: PAIN_FUNCTIONAL_ASSESSMENT: 0-10

## 2025-03-03 NOTE — PROGRESS NOTES
"Subjective   Patient ID [id number]   Saleem Jo \"Rebecca\" is a 29 y.o.  at 28w0d with a working estimated date of delivery of 2025, by Ultrasound who presents for a routine prenatal visit.     Doing well today. She denies vaginal bleeding, leakage of fluid, decreased fetal movements, or contractions.    Objective   Physical Exam:   Weight: 105 kg (231 lb)  Pregravid BMI: 24.82  BP: 130/72  Fetal Heart Rate: 140 Fundal Height (cm): 29 cm           Prenatal Labs  Urine Dip  Results for orders placed or performed in visit on 25   POCT UA Automated manually resulted    Collection Time: 25  2:00 PM   Result Value Ref Range    POC Color, Urine Yellow Straw, Yellow, Light-Yellow    POC Appearance, Urine Clear Clear    POC Glucose, Urine NEGATIVE NEGATIVE mg/dl    POC Bilirubin, Urine NEGATIVE NEGATIVE    POC Ketones, Urine NEGATIVE NEGATIVE mg/dl    POC Specific Gravity, Urine 1.015 1.005 - 1.035    POC Blood, Urine NEGATIVE NEGATIVE    POC PH, Urine 7.5 No Reference Range Established PH    POC Protein, Urine NEGATIVE NEGATIVE mg/dl    POC Urobilinogen, Urine 0.2 0.2, 1.0 EU/DL    Poc Nitrite, Urine NEGATIVE NEGATIVE    POC Leukocytes, Urine NEGATIVE NEGATIVE     Assessment/Plan     29 y.o.  at 28w0d , LANCE  Assessment & Plan  Prenatal care in third trimester ([facility])  -Continue prenatal vitamin and bASA   -1 hour GCT, CBC, Tdap today  -Discussed that she plans on breast feeding  -PPBC: Condoms  -Prefers to go into normal labor but is agreeable for IOL LT  -Labor precautions and fetal kick counts reviewed  -Return precautions/when to call discussed thoroughly  -Follow up in 2 week for a routine prenatal visit    Orders:    POCT UA Automated manually resulted    Tdap vaccine, age 7 years and older    [facility] imaging order; Future    28 weeks gestation of pregnancy ([facility])           Alonzo Brunner MD  "

## 2025-03-04 LAB
GLUCOSE 1H P 50 G GLC PO SERPL-MCNC: 99 MG/DL
REFLEX ADDED, ANEMIA PANEL: NORMAL

## 2025-03-06 ENCOUNTER — APPOINTMENT (OUTPATIENT)
Dept: OBSTETRICS AND GYNECOLOGY | Facility: CLINIC | Age: 30
End: 2025-03-06
Payer: OTHER GOVERNMENT

## 2025-03-20 ENCOUNTER — HOSPITAL ENCOUNTER (OUTPATIENT)
Dept: RADIOLOGY | Facility: CLINIC | Age: 30
Discharge: HOME | End: 2025-03-20
Payer: OTHER GOVERNMENT

## 2025-03-20 DIAGNOSIS — Z34.93 PRENATAL CARE IN THIRD TRIMESTER (HHS-HCC): ICD-10-CM

## 2025-03-20 DIAGNOSIS — O26.843 UTERINE SIZE DATE DISCREPANCY PREGNANCY, THIRD TRIMESTER (HHS-HCC): ICD-10-CM

## 2025-03-20 PROCEDURE — 76816 OB US FOLLOW-UP PER FETUS: CPT

## 2025-04-01 NOTE — PROGRESS NOTES
"Subjective   Patient ID [id number]   Saleem Jo \"Berenice" is a 29 y.o.  at 32w3d with a working estimated date of delivery of 2025, by Ultrasound who presents for a routine prenatal visit.     She denies vaginal bleeding, leakage of fluid, decreased fetal movements, or contractions.    Her pregnancy is complicated by:  1.FOB with history of non specific cardiomyopathy: Normal fetal echo, s/p peds cardiology consult, standard  care    -Last growth US:30 wk growth US showing: cephalic fetus, EFW 3lb 15oz, 1790g, 75%ile, anterior placenta    Objective   Physical Exam:   Weight: 109 kg (240 lb)  Pregravid BMI: 24.82  BP: 116/66  Fetal Heart Rate: 120 Fundal Height (cm): 32 cm Presentation: Vertex (Based on 30 wk growth US)         Prenatal Labs  Urine Dip  Results for orders placed or performed in visit on 25   POCT UA Automated manually resulted    Collection Time: 25  3:00 PM   Result Value Ref Range    POC Color, Urine Yellow Straw, Yellow, Light-Yellow    POC Appearance, Urine Clear Clear    POC Glucose, Urine NEGATIVE NEGATIVE mg/dl    POC Bilirubin, Urine NEGATIVE NEGATIVE    POC Ketones, Urine NEGATIVE NEGATIVE mg/dl    POC Specific Gravity, Urine 1.020 1.005 - 1.035    POC Blood, Urine TRACE-Intact (A) NEGATIVE    POC PH, Urine 7.5 No Reference Range Established PH    POC Protein, Urine NEGATIVE NEGATIVE mg/dl    POC Urobilinogen, Urine 0.2 0.2, 1.0 EU/DL    Poc Nitrite, Urine NEGATIVE NEGATIVE    POC Leukocytes, Urine NEGATIVE NEGATIVE     Assessment/Plan     29 y.o.  at 32w3d , LANCE  Assessment & Plan  Prenatal care, first pregnancy in third trimester  -Continue prenatal vitamin, BASA  -Reviewed 30 wk growth US showing: cephalic fetus, EFW 3lb 15oz, 1790g, 75%ile, anterior placenta  -Labor precautions and fetal kick counts reviewed  -Return precautions/when to call discussed thoroughly  -Follow up in 2 week for a routine prenatal visit  Orders:    POCT UA Automated manually " resulted    32 weeks gestation of pregnancy ([facility])           Alonzo Brunner MD

## 2025-04-03 ENCOUNTER — ROUTINE PRENATAL (OUTPATIENT)
Dept: OBSTETRICS AND GYNECOLOGY | Facility: CLINIC | Age: 30
End: 2025-04-03
Payer: OTHER GOVERNMENT

## 2025-04-03 VITALS — WEIGHT: 240 LBS | BODY MASS INDEX: 37.23 KG/M2 | SYSTOLIC BLOOD PRESSURE: 116 MMHG | DIASTOLIC BLOOD PRESSURE: 66 MMHG

## 2025-04-03 DIAGNOSIS — Z34.03 PRENATAL CARE, FIRST PREGNANCY IN THIRD TRIMESTER: Primary | ICD-10-CM

## 2025-04-03 DIAGNOSIS — Z3A.32 32 WEEKS GESTATION OF PREGNANCY (HHS-HCC): ICD-10-CM

## 2025-04-03 LAB
POC APPEARANCE, URINE: CLEAR
POC BILIRUBIN, URINE: NEGATIVE
POC BLOOD, URINE: ABNORMAL
POC COLOR, URINE: YELLOW
POC GLUCOSE, URINE: NEGATIVE MG/DL
POC KETONES, URINE: NEGATIVE MG/DL
POC LEUKOCYTES, URINE: NEGATIVE
POC NITRITE,URINE: NEGATIVE
POC PH, URINE: 7.5 PH
POC PROTEIN, URINE: NEGATIVE MG/DL
POC SPECIFIC GRAVITY, URINE: 1.02
POC UROBILINOGEN, URINE: 0.2 EU/DL

## 2025-04-03 PROCEDURE — 81003 URINALYSIS AUTO W/O SCOPE: CPT | Performed by: STUDENT IN AN ORGANIZED HEALTH CARE EDUCATION/TRAINING PROGRAM

## 2025-04-03 ASSESSMENT — PATIENT HEALTH QUESTIONNAIRE - PHQ9
SUM OF ALL RESPONSES TO PHQ9 QUESTIONS 1 & 2: 0
1. LITTLE INTEREST OR PLEASURE IN DOING THINGS: NOT AT ALL
2. FEELING DOWN, DEPRESSED OR HOPELESS: NOT AT ALL

## 2025-04-03 ASSESSMENT — ENCOUNTER SYMPTOMS
OCCASIONAL FEELINGS OF UNSTEADINESS: 0
LOSS OF SENSATION IN FEET: 0
DEPRESSION: 0

## 2025-04-03 ASSESSMENT — PAIN SCALES - GENERAL: PAINLEVEL_OUTOF10: 0 - NO PAIN

## 2025-04-03 ASSESSMENT — PAIN - FUNCTIONAL ASSESSMENT: PAIN_FUNCTIONAL_ASSESSMENT: 0-10

## 2025-04-16 NOTE — PROGRESS NOTES
"Subjective   Patient ID [id number]   Saleem Jo \"Berenice" is a 29 y.o.  at 34w3d with a working estimated date of delivery of 2025, by Ultrasound who presents for a routine prenatal visit. She denies vaginal bleeding, leakage of fluid, decreased fetal movements, or contractions.    Her pregnancy is complicated by:  1.FOB with history of non specific cardiomyopathy: Normal fetal echo, s/p peds cardiology consult, standard  care     -Last growth US:30 wk growth US showing: cephalic fetus, EFW 3lb 15oz, 1790g, 75%ile, anterior placenta    Objective   Physical Exam:   Weight: 109 kg (241 lb 6.4 oz)  Pregravid BMI: 24.82  BP: 124/76  Fetal Heart Rate: 125 Fundal Height (cm): 35 cm             Prenatal Labs  Urine Dip  Results for orders placed or performed in visit on 25   POCT UA Automated manually resulted    Collection Time: 25 11:07 AM   Result Value Ref Range    POC Color, Urine Yellow Straw, Yellow, Light-Yellow    POC Appearance, Urine Clear Clear    POC Glucose, Urine NEGATIVE NEGATIVE mg/dl    POC Bilirubin, Urine NEGATIVE NEGATIVE    POC Ketones, Urine NEGATIVE NEGATIVE mg/dl    POC Specific Gravity, Urine 1.020 1.005 - 1.035    POC Blood, Urine NEGATIVE NEGATIVE    POC PH, Urine 7.0 No Reference Range Established PH    POC Protein, Urine NEGATIVE NEGATIVE mg/dl    POC Urobilinogen, Urine 0.2 0.2, 1.0 EU/DL    Poc Nitrite, Urine NEGATIVE NEGATIVE    POC Leukocytes, Urine TRACE (A) NEGATIVE     Assessment/Plan     29 y.o.  at 34w3d , LANCE  Assessment & Plan  Prenatal care, first pregnancy in third trimester  -Continue prenatal vitamin, bASA  -GBS/consents next visit   -Labor precautions and fetal kick counts reviewed  -Return precautions/when to call discussed thoroughly  -Follow up in 2 week for a routine prenatal visit  Orders:    POCT UA Automated manually resulted    34 weeks gestation of pregnancy ([facility])           Alonzo Brunner MD  "

## 2025-04-17 ENCOUNTER — ROUTINE PRENATAL (OUTPATIENT)
Dept: OBSTETRICS AND GYNECOLOGY | Facility: CLINIC | Age: 30
End: 2025-04-17
Payer: OTHER GOVERNMENT

## 2025-04-17 VITALS — BODY MASS INDEX: 37.45 KG/M2 | SYSTOLIC BLOOD PRESSURE: 124 MMHG | WEIGHT: 241.4 LBS | DIASTOLIC BLOOD PRESSURE: 76 MMHG

## 2025-04-17 DIAGNOSIS — Z3A.34 34 WEEKS GESTATION OF PREGNANCY (HHS-HCC): ICD-10-CM

## 2025-04-17 DIAGNOSIS — Z34.03 PRENATAL CARE, FIRST PREGNANCY IN THIRD TRIMESTER: Primary | ICD-10-CM

## 2025-04-17 LAB
POC APPEARANCE, URINE: CLEAR
POC BILIRUBIN, URINE: NEGATIVE
POC BLOOD, URINE: NEGATIVE
POC COLOR, URINE: YELLOW
POC GLUCOSE, URINE: NEGATIVE MG/DL
POC KETONES, URINE: NEGATIVE MG/DL
POC LEUKOCYTES, URINE: ABNORMAL
POC NITRITE,URINE: NEGATIVE
POC PH, URINE: 7 PH
POC PROTEIN, URINE: NEGATIVE MG/DL
POC SPECIFIC GRAVITY, URINE: 1.02
POC UROBILINOGEN, URINE: 0.2 EU/DL

## 2025-04-17 PROCEDURE — 81003 URINALYSIS AUTO W/O SCOPE: CPT | Performed by: STUDENT IN AN ORGANIZED HEALTH CARE EDUCATION/TRAINING PROGRAM

## 2025-04-17 ASSESSMENT — ENCOUNTER SYMPTOMS
OCCASIONAL FEELINGS OF UNSTEADINESS: 0
LOSS OF SENSATION IN FEET: 0
DEPRESSION: 0

## 2025-04-17 ASSESSMENT — PAIN SCALES - GENERAL: PAINLEVEL_OUTOF10: 0 - NO PAIN

## 2025-04-17 ASSESSMENT — PAIN - FUNCTIONAL ASSESSMENT: PAIN_FUNCTIONAL_ASSESSMENT: 0-10

## 2025-04-17 ASSESSMENT — PATIENT HEALTH QUESTIONNAIRE - PHQ9
2. FEELING DOWN, DEPRESSED OR HOPELESS: NOT AT ALL
SUM OF ALL RESPONSES TO PHQ9 QUESTIONS 1 & 2: 0
1. LITTLE INTEREST OR PLEASURE IN DOING THINGS: NOT AT ALL

## 2025-04-22 ENCOUNTER — PATIENT MESSAGE (OUTPATIENT)
Dept: OBSTETRICS AND GYNECOLOGY | Facility: CLINIC | Age: 30
End: 2025-04-22
Payer: OTHER GOVERNMENT

## 2025-04-23 NOTE — PROGRESS NOTES
"Subjective   Patient ID [id number]   Saleem Jo \"Rebecca\" is a 29 y.o.  at 36w2d with a working estimated date of delivery of 2025, by Ultrasound who presents for a routine prenatal visit. She denies vaginal bleeding, leakage of fluid, decreased fetal movements, or contractions.    Her pregnancy is complicated by:  1.FOB with history of non specific cardiomyopathy: Normal fetal echo, s/p peds cardiology consult, standard  care     -Last growth US:30 wk growth US showing: cephalic fetus, EFW 3lb 15oz, 1790g, 75%ile, anterior placenta    Objective   Physical Exam:   Weight: 111 kg (244 lb 12.8 oz)  Pregravid BMI: 24.82  BP: 132/72  Fetal Heart Rate: 120s Fundal Height (cm): 37 cm           Prenatal Labs  Urine Dip  Results for orders placed or performed in visit on 25   POCT UA Automated manually resulted    Collection Time: 25  9:10 AM   Result Value Ref Range    POC Color, Urine Yellow Straw, Yellow, Light-Yellow    POC Appearance, Urine Clear Clear    POC Glucose, Urine NEGATIVE NEGATIVE mg/dl    POC Bilirubin, Urine NEGATIVE NEGATIVE    POC Ketones, Urine NEGATIVE NEGATIVE mg/dl    POC Specific Gravity, Urine 1.025 1.005 - 1.035    POC Blood, Urine TRACE-Intact (A) NEGATIVE    POC PH, Urine 6.5 No Reference Range Established PH    POC Protein, Urine NEGATIVE NEGATIVE mg/dl    POC Urobilinogen, Urine 0.2 0.2, 1.0 EU/DL    Poc Nitrite, Urine NEGATIVE NEGATIVE    POC Leukocytes, Urine SMALL (1+) (A) NEGATIVE     Assessment/Plan     29 y.o.  at 36w2d , LANCE  Assessment & Plan  Prenatal care, first pregnancy in third trimester  -Continue prenatal vitamin  -GBS collected today  -Labor consents signed today  -Labor precautions and fetal kick counts reviewed  -Return precautions/when to call discussed thoroughly  -Follow up in 1 week for a routine prenatal visit  Orders:    POCT UA Automated manually resulted    Group B Streptococcus (GBS) Prenatal Screen, Culture    US [facility] imaging " order; Future    Urine culture    36 weeks gestation of pregnancy ([facility])           Alonzo Brunner MD

## 2025-04-30 ENCOUNTER — ROUTINE PRENATAL (OUTPATIENT)
Dept: OBSTETRICS AND GYNECOLOGY | Facility: CLINIC | Age: 30
End: 2025-04-30
Payer: OTHER GOVERNMENT

## 2025-04-30 VITALS — WEIGHT: 244.8 LBS | SYSTOLIC BLOOD PRESSURE: 132 MMHG | DIASTOLIC BLOOD PRESSURE: 72 MMHG | BODY MASS INDEX: 37.97 KG/M2

## 2025-04-30 DIAGNOSIS — Z3A.36 36 WEEKS GESTATION OF PREGNANCY (HHS-HCC): ICD-10-CM

## 2025-04-30 DIAGNOSIS — Z34.03 PRENATAL CARE, FIRST PREGNANCY IN THIRD TRIMESTER: Primary | ICD-10-CM

## 2025-04-30 LAB
POC APPEARANCE, URINE: CLEAR
POC BILIRUBIN, URINE: NEGATIVE
POC BLOOD, URINE: ABNORMAL
POC COLOR, URINE: YELLOW
POC GLUCOSE, URINE: NEGATIVE MG/DL
POC KETONES, URINE: NEGATIVE MG/DL
POC LEUKOCYTES, URINE: ABNORMAL
POC NITRITE,URINE: NEGATIVE
POC PH, URINE: 6.5 PH
POC PROTEIN, URINE: NEGATIVE MG/DL
POC SPECIFIC GRAVITY, URINE: 1.02
POC UROBILINOGEN, URINE: 0.2 EU/DL

## 2025-04-30 PROCEDURE — 81003 URINALYSIS AUTO W/O SCOPE: CPT | Mod: QW | Performed by: STUDENT IN AN ORGANIZED HEALTH CARE EDUCATION/TRAINING PROGRAM

## 2025-04-30 ASSESSMENT — ENCOUNTER SYMPTOMS
OCCASIONAL FEELINGS OF UNSTEADINESS: 0
DEPRESSION: 0
LOSS OF SENSATION IN FEET: 0

## 2025-04-30 ASSESSMENT — PAIN - FUNCTIONAL ASSESSMENT: PAIN_FUNCTIONAL_ASSESSMENT: 0-10

## 2025-04-30 ASSESSMENT — PAIN SCALES - GENERAL: PAINLEVEL_OUTOF10: 0 - NO PAIN

## 2025-05-01 ENCOUNTER — APPOINTMENT (OUTPATIENT)
Dept: OBSTETRICS AND GYNECOLOGY | Facility: CLINIC | Age: 30
End: 2025-05-01
Payer: OTHER GOVERNMENT

## 2025-05-02 DIAGNOSIS — O23.43 URINARY TRACT INFECTION IN MOTHER DURING THIRD TRIMESTER OF PREGNANCY (HHS-HCC): Primary | ICD-10-CM

## 2025-05-02 RX ORDER — CEPHALEXIN 500 MG/1
500 CAPSULE ORAL 2 TIMES DAILY
Qty: 14 CAPSULE | Refills: 0 | Status: SHIPPED | OUTPATIENT
Start: 2025-05-02 | End: 2025-05-09

## 2025-05-03 LAB
BACTERIA UR CULT: ABNORMAL
GP B STREP SPEC QL CULT: NORMAL

## 2025-05-05 DIAGNOSIS — O23.43 URINARY TRACT INFECTION IN MOTHER DURING THIRD TRIMESTER OF PREGNANCY (HHS-HCC): Primary | ICD-10-CM

## 2025-05-05 RX ORDER — NITROFURANTOIN 25; 75 MG/1; MG/1
100 CAPSULE ORAL 2 TIMES DAILY
Qty: 10 CAPSULE | Refills: 0 | Status: SHIPPED | OUTPATIENT
Start: 2025-05-05 | End: 2025-05-11 | Stop reason: HOSPADM

## 2025-05-06 ENCOUNTER — TELEPHONE (OUTPATIENT)
Dept: OBSTETRICS AND GYNECOLOGY | Facility: CLINIC | Age: 30
End: 2025-05-06
Payer: OTHER GOVERNMENT

## 2025-05-06 ENCOUNTER — ANESTHESIA EVENT (OUTPATIENT)
Dept: OBSTETRICS AND GYNECOLOGY | Facility: HOSPITAL | Age: 30
End: 2025-05-06
Payer: OTHER GOVERNMENT

## 2025-05-06 ENCOUNTER — HOSPITAL ENCOUNTER (INPATIENT)
Facility: HOSPITAL | Age: 30
LOS: 5 days | Discharge: HOME | End: 2025-05-11
Attending: STUDENT IN AN ORGANIZED HEALTH CARE EDUCATION/TRAINING PROGRAM | Admitting: OBSTETRICS & GYNECOLOGY
Payer: OTHER GOVERNMENT

## 2025-05-06 DIAGNOSIS — G89.18 POST-OP PAIN: Primary | ICD-10-CM

## 2025-05-06 PROBLEM — O14.13 SEVERE PREECLAMPSIA, THIRD TRIMESTER (HHS-HCC): Status: ACTIVE | Noted: 2025-05-06

## 2025-05-06 LAB
ABO GROUP (TYPE) IN BLOOD: NORMAL
ABO GROUP (TYPE) IN BLOOD: NORMAL
ALBUMIN SERPL BCP-MCNC: 3.5 G/DL (ref 3.4–5)
ALP SERPL-CCNC: 100 U/L (ref 33–110)
ALT SERPL W P-5'-P-CCNC: 14 U/L (ref 7–45)
ANION GAP SERPL CALCULATED.3IONS-SCNC: 13 MMOL/L (ref 10–20)
ANTIBODY SCREEN: NORMAL
AST SERPL W P-5'-P-CCNC: 14 U/L (ref 9–39)
BILIRUB SERPL-MCNC: 0.3 MG/DL (ref 0–1.2)
BILIRUBIN, POC: NEGATIVE
BLOOD URINE, POC: POSITIVE
BUN SERPL-MCNC: 7 MG/DL (ref 6–23)
CALCIUM SERPL-MCNC: 9 MG/DL (ref 8.6–10.3)
CHLORIDE SERPL-SCNC: 108 MMOL/L (ref 98–107)
CLARITY, POC: CLEAR
CO2 SERPL-SCNC: 20 MMOL/L (ref 21–32)
COLOR, POC: YELLOW
CREAT SERPL-MCNC: 0.6 MG/DL (ref 0.5–1.05)
CREAT UR-MCNC: 66.5 MG/DL (ref 20–320)
EGFRCR SERPLBLD CKD-EPI 2021: >90 ML/MIN/1.73M*2
ERYTHROCYTE [DISTWIDTH] IN BLOOD BY AUTOMATED COUNT: 13.3 % (ref 11.5–14.5)
GLUCOSE SERPL-MCNC: 109 MG/DL (ref 74–99)
GLUCOSE URINE, POC: NEGATIVE
HCT VFR BLD AUTO: 34.7 % (ref 36–46)
HGB BLD-MCNC: 11.4 G/DL (ref 12–16)
KETONES, POC: POSITIVE
LDH SERPL L TO P-CCNC: 133 U/L (ref 84–246)
LEUKOCYTE EST, POC: NORMAL
MCH RBC QN AUTO: 28.6 PG (ref 26–34)
MCHC RBC AUTO-ENTMCNC: 32.9 G/DL (ref 32–36)
MCV RBC AUTO: 87 FL (ref 80–100)
NITRITE, POC: NEGATIVE
NRBC BLD-RTO: 0 /100 WBCS (ref 0–0)
PH, POC: 6
PLATELET # BLD AUTO: 179 X10*3/UL (ref 150–450)
POTASSIUM SERPL-SCNC: 3.7 MMOL/L (ref 3.5–5.3)
PROT SERPL-MCNC: 6.4 G/DL (ref 6.4–8.2)
PROT UR-MCNC: 9 MG/DL (ref 5–24)
PROT/CREAT UR: 0.14 MG/MG CREAT (ref 0–0.17)
RBC # BLD AUTO: 3.99 X10*6/UL (ref 4–5.2)
RH FACTOR (ANTIGEN D): NORMAL
RH FACTOR (ANTIGEN D): NORMAL
SODIUM SERPL-SCNC: 137 MMOL/L (ref 136–145)
SPECIFIC GRAVITY, POC: 1.01
URATE SERPL-MCNC: 5.1 MG/DL (ref 2.3–6.7)
URINE PROTEIN, POC: ABNORMAL
UROBILINOGEN, POC: NEGATIVE
WBC # BLD AUTO: 11.2 X10*3/UL (ref 4.4–11.3)

## 2025-05-06 PROCEDURE — 86780 TREPONEMA PALLIDUM: CPT | Mod: TRILAB | Performed by: OBSTETRICS & GYNECOLOGY

## 2025-05-06 PROCEDURE — 84550 ASSAY OF BLOOD/URIC ACID: CPT | Performed by: OBSTETRICS & GYNECOLOGY

## 2025-05-06 PROCEDURE — 2500000002 HC RX 250 W HCPCS SELF ADMINISTERED DRUGS (ALT 637 FOR MEDICARE OP, ALT 636 FOR OP/ED): Performed by: OBSTETRICS & GYNECOLOGY

## 2025-05-06 PROCEDURE — 36415 COLL VENOUS BLD VENIPUNCTURE: CPT | Performed by: STUDENT IN AN ORGANIZED HEALTH CARE EDUCATION/TRAINING PROGRAM

## 2025-05-06 PROCEDURE — 36415 COLL VENOUS BLD VENIPUNCTURE: CPT | Performed by: OBSTETRICS & GYNECOLOGY

## 2025-05-06 PROCEDURE — 1220000001 HC OB SEMI-PRIVATE ROOM DAILY

## 2025-05-06 PROCEDURE — 99223 1ST HOSP IP/OBS HIGH 75: CPT | Performed by: OBSTETRICS & GYNECOLOGY

## 2025-05-06 PROCEDURE — 81002 URINALYSIS NONAUTO W/O SCOPE: CPT | Performed by: OBSTETRICS & GYNECOLOGY

## 2025-05-06 PROCEDURE — 7210000002 HC LABOR PER HOUR

## 2025-05-06 PROCEDURE — 86900 BLOOD TYPING SEROLOGIC ABO: CPT | Performed by: OBSTETRICS & GYNECOLOGY

## 2025-05-06 PROCEDURE — 85027 COMPLETE CBC AUTOMATED: CPT | Performed by: OBSTETRICS & GYNECOLOGY

## 2025-05-06 PROCEDURE — 59025 FETAL NON-STRESS TEST: CPT | Performed by: OBSTETRICS & GYNECOLOGY

## 2025-05-06 PROCEDURE — 83615 LACTATE (LD) (LDH) ENZYME: CPT | Performed by: OBSTETRICS & GYNECOLOGY

## 2025-05-06 PROCEDURE — 84156 ASSAY OF PROTEIN URINE: CPT | Performed by: OBSTETRICS & GYNECOLOGY

## 2025-05-06 PROCEDURE — 59050 FETAL MONITOR W/REPORT: CPT

## 2025-05-06 PROCEDURE — 2500000004 HC RX 250 GENERAL PHARMACY W/ HCPCS (ALT 636 FOR OP/ED): Performed by: OBSTETRICS & GYNECOLOGY

## 2025-05-06 PROCEDURE — 80053 COMPREHEN METABOLIC PANEL: CPT | Performed by: OBSTETRICS & GYNECOLOGY

## 2025-05-06 RX ORDER — ONDANSETRON HYDROCHLORIDE 2 MG/ML
4 INJECTION, SOLUTION INTRAVENOUS EVERY 6 HOURS PRN
Status: DISCONTINUED | OUTPATIENT
Start: 2025-05-06 | End: 2025-05-08

## 2025-05-06 RX ORDER — LABETALOL HYDROCHLORIDE 5 MG/ML
20 INJECTION, SOLUTION INTRAVENOUS ONCE AS NEEDED
Status: DISCONTINUED | OUTPATIENT
Start: 2025-05-06 | End: 2025-05-08

## 2025-05-06 RX ORDER — LABETALOL HYDROCHLORIDE 5 MG/ML
40 INJECTION, SOLUTION INTRAVENOUS ONCE AS NEEDED
Status: DISCONTINUED | OUTPATIENT
Start: 2025-05-06 | End: 2025-05-08

## 2025-05-06 RX ORDER — SULFAMETHOXAZOLE AND TRIMETHOPRIM 800; 160 MG/1; MG/1
1 TABLET ORAL EVERY 12 HOURS SCHEDULED
Status: DISCONTINUED | OUTPATIENT
Start: 2025-05-06 | End: 2025-05-08

## 2025-05-06 RX ORDER — TERBUTALINE SULFATE 1 MG/ML
0.25 INJECTION SUBCUTANEOUS ONCE AS NEEDED
Status: DISCONTINUED | OUTPATIENT
Start: 2025-05-06 | End: 2025-05-08

## 2025-05-06 RX ORDER — OXYTOCIN/0.9 % SODIUM CHLORIDE 30/500 ML
60 PLASTIC BAG, INJECTION (ML) INTRAVENOUS
Status: DISCONTINUED | OUTPATIENT
Start: 2025-05-06 | End: 2025-05-08

## 2025-05-06 RX ORDER — LOPERAMIDE HYDROCHLORIDE 2 MG/1
4 CAPSULE ORAL EVERY 2 HOUR PRN
Status: DISCONTINUED | OUTPATIENT
Start: 2025-05-06 | End: 2025-05-08

## 2025-05-06 RX ORDER — LABETALOL HYDROCHLORIDE 5 MG/ML
20 INJECTION, SOLUTION INTRAVENOUS ONCE
Status: COMPLETED | OUTPATIENT
Start: 2025-05-06 | End: 2025-05-06

## 2025-05-06 RX ORDER — HYDRALAZINE HYDROCHLORIDE 20 MG/ML
10 INJECTION INTRAMUSCULAR; INTRAVENOUS ONCE AS NEEDED
Status: DISCONTINUED | OUTPATIENT
Start: 2025-05-06 | End: 2025-05-08

## 2025-05-06 RX ORDER — MISOPROSTOL 100 UG/1
25 TABLET ORAL
Status: COMPLETED | OUTPATIENT
Start: 2025-05-06 | End: 2025-05-07

## 2025-05-06 RX ORDER — OXYTOCIN 10 [USP'U]/ML
10 INJECTION, SOLUTION INTRAMUSCULAR; INTRAVENOUS ONCE AS NEEDED
Status: DISCONTINUED | OUTPATIENT
Start: 2025-05-06 | End: 2025-05-08

## 2025-05-06 RX ORDER — LIDOCAINE HYDROCHLORIDE 10 MG/ML
30 INJECTION, SOLUTION INFILTRATION; PERINEURAL ONCE AS NEEDED
Status: DISCONTINUED | OUTPATIENT
Start: 2025-05-06 | End: 2025-05-08

## 2025-05-06 RX ORDER — SODIUM CHLORIDE, SODIUM LACTATE, POTASSIUM CHLORIDE, CALCIUM CHLORIDE 600; 310; 30; 20 MG/100ML; MG/100ML; MG/100ML; MG/100ML
75 INJECTION, SOLUTION INTRAVENOUS CONTINUOUS
Status: DISCONTINUED | OUTPATIENT
Start: 2025-05-06 | End: 2025-05-08

## 2025-05-06 RX ORDER — LABETALOL HYDROCHLORIDE 5 MG/ML
80 INJECTION, SOLUTION INTRAVENOUS ONCE AS NEEDED
Status: DISCONTINUED | OUTPATIENT
Start: 2025-05-06 | End: 2025-05-08

## 2025-05-06 RX ORDER — MISOPROSTOL 200 UG/1
800 TABLET ORAL ONCE AS NEEDED
Status: DISCONTINUED | OUTPATIENT
Start: 2025-05-06 | End: 2025-05-08

## 2025-05-06 RX ORDER — NIFEDIPINE 10 MG/1
10 CAPSULE ORAL ONCE AS NEEDED
Status: DISCONTINUED | OUTPATIENT
Start: 2025-05-06 | End: 2025-05-08

## 2025-05-06 RX ORDER — ONDANSETRON 4 MG/1
4 TABLET, FILM COATED ORAL EVERY 6 HOURS PRN
Status: DISCONTINUED | OUTPATIENT
Start: 2025-05-06 | End: 2025-05-08

## 2025-05-06 RX ORDER — NITROFURANTOIN 25; 75 MG/1; MG/1
100 CAPSULE ORAL 2 TIMES DAILY
Status: DISCONTINUED | OUTPATIENT
Start: 2025-05-06 | End: 2025-05-06

## 2025-05-06 RX ORDER — METHYLERGONOVINE MALEATE 0.2 MG/ML
0.2 INJECTION INTRAVENOUS ONCE AS NEEDED
Status: DISCONTINUED | OUTPATIENT
Start: 2025-05-06 | End: 2025-05-08

## 2025-05-06 RX ORDER — CARBOPROST TROMETHAMINE 250 UG/ML
250 INJECTION, SOLUTION INTRAMUSCULAR ONCE AS NEEDED
Status: DISCONTINUED | OUTPATIENT
Start: 2025-05-06 | End: 2025-05-08

## 2025-05-06 RX ORDER — HYDRALAZINE HYDROCHLORIDE 20 MG/ML
5 INJECTION INTRAMUSCULAR; INTRAVENOUS ONCE AS NEEDED
Status: DISCONTINUED | OUTPATIENT
Start: 2025-05-06 | End: 2025-05-08

## 2025-05-06 RX ADMIN — SULFAMETHOXAZOLE AND TRIMETHOPRIM 1 TABLET: 800; 160 TABLET ORAL at 23:23

## 2025-05-06 RX ADMIN — MISOPROSTOL 25 MCG: 100 TABLET ORAL at 23:02

## 2025-05-06 RX ADMIN — LABETALOL HYDROCHLORIDE 20 MG: 5 INJECTION INTRAVENOUS at 18:19

## 2025-05-06 RX ADMIN — MISOPROSTOL 25 MCG: 100 TABLET ORAL at 20:45

## 2025-05-06 SDOH — ECONOMIC STABILITY: FOOD INSECURITY: WITHIN THE PAST 12 MONTHS, THE FOOD YOU BOUGHT JUST DIDN'T LAST AND YOU DIDN'T HAVE MONEY TO GET MORE.: NEVER TRUE

## 2025-05-06 SDOH — HEALTH STABILITY: MENTAL HEALTH: HAVE YOU USED ANY PRESCRIPTION DRUGS OTHER THAN PRESCRIBED IN THE PAST 12 MONTHS?: NO

## 2025-05-06 SDOH — SOCIAL STABILITY: SOCIAL INSECURITY: WITHIN THE LAST YEAR, HAVE YOU BEEN AFRAID OF YOUR PARTNER OR EX-PARTNER?: NO

## 2025-05-06 SDOH — SOCIAL STABILITY: SOCIAL NETWORK: HOW OFTEN DO YOU ATTEND MEETINGS OF THE CLUBS OR ORGANIZATIONS YOU BELONG TO?: MORE THAN 4 TIMES PER YEAR

## 2025-05-06 SDOH — SOCIAL STABILITY: SOCIAL INSECURITY: HAVE YOU HAD THOUGHTS OF HARMING ANYONE ELSE?: NO

## 2025-05-06 SDOH — SOCIAL STABILITY: SOCIAL INSECURITY
WITHIN THE LAST YEAR, HAVE YOU BEEN KICKED, HIT, SLAPPED, OR OTHERWISE PHYSICALLY HURT BY YOUR PARTNER OR EX-PARTNER?: NO

## 2025-05-06 SDOH — ECONOMIC STABILITY: FOOD INSECURITY: WITHIN THE PAST 12 MONTHS, YOU WORRIED THAT YOUR FOOD WOULD RUN OUT BEFORE YOU GOT THE MONEY TO BUY MORE.: NEVER TRUE

## 2025-05-06 SDOH — SOCIAL STABILITY: SOCIAL NETWORK: HOW OFTEN DO YOU ATTEND CHURCH OR RELIGIOUS SERVICES?: MORE THAN 4 TIMES PER YEAR

## 2025-05-06 SDOH — SOCIAL STABILITY: SOCIAL INSECURITY: WITHIN THE LAST YEAR, HAVE YOU BEEN HUMILIATED OR EMOTIONALLY ABUSED IN OTHER WAYS BY YOUR PARTNER OR EX-PARTNER?: NO

## 2025-05-06 SDOH — SOCIAL STABILITY: SOCIAL INSECURITY
WITHIN THE LAST YEAR, HAVE YOU BEEN RAPED OR FORCED TO HAVE ANY KIND OF SEXUAL ACTIVITY BY YOUR PARTNER OR EX-PARTNER?: NO

## 2025-05-06 SDOH — HEALTH STABILITY: MENTAL HEALTH
DO YOU FEEL STRESS - TENSE, RESTLESS, NERVOUS, OR ANXIOUS, OR UNABLE TO SLEEP AT NIGHT BECAUSE YOUR MIND IS TROUBLED ALL THE TIME - THESE DAYS?: NOT AT ALL

## 2025-05-06 SDOH — HEALTH STABILITY: PHYSICAL HEALTH
HOW OFTEN DO YOU NEED TO HAVE SOMEONE HELP YOU WHEN YOU READ INSTRUCTIONS, PAMPHLETS, OR OTHER WRITTEN MATERIAL FROM YOUR DOCTOR OR PHARMACY?: NEVER

## 2025-05-06 SDOH — HEALTH STABILITY: MENTAL HEALTH: SUICIDAL BEHAVIOR (LIFETIME): NO

## 2025-05-06 SDOH — SOCIAL STABILITY: SOCIAL INSECURITY: HAS ANYONE EVER THREATENED TO HURT YOUR FAMILY OR YOUR PETS?: NO

## 2025-05-06 SDOH — SOCIAL STABILITY: SOCIAL INSECURITY: ABUSE SCREEN: ADULT

## 2025-05-06 SDOH — SOCIAL STABILITY: SOCIAL NETWORK
DO YOU BELONG TO ANY CLUBS OR ORGANIZATIONS SUCH AS CHURCH GROUPS, UNIONS, FRATERNAL OR ATHLETIC GROUPS, OR SCHOOL GROUPS?: YES

## 2025-05-06 SDOH — HEALTH STABILITY: MENTAL HEALTH: NON-SPECIFIC ACTIVE SUICIDAL THOUGHTS (PAST 1 MONTH): NO

## 2025-05-06 SDOH — HEALTH STABILITY: PHYSICAL HEALTH: ON AVERAGE, HOW MANY MINUTES DO YOU ENGAGE IN EXERCISE AT THIS LEVEL?: 30 MIN

## 2025-05-06 SDOH — SOCIAL STABILITY: SOCIAL NETWORK
IN A TYPICAL WEEK, HOW MANY TIMES DO YOU TALK ON THE PHONE WITH FAMILY, FRIENDS, OR NEIGHBORS?: MORE THAN THREE TIMES A WEEK

## 2025-05-06 SDOH — HEALTH STABILITY: MENTAL HEALTH: STRENGTHS (MUST CHOOSE TWO): SUPPORT FROM FAMILY;SUPPORT FROM FRIENDS

## 2025-05-06 SDOH — SOCIAL STABILITY: SOCIAL INSECURITY: ARE YOU MARRIED, WIDOWED, DIVORCED, SEPARATED, NEVER MARRIED, OR LIVING WITH A PARTNER?: MARRIED

## 2025-05-06 SDOH — SOCIAL STABILITY: SOCIAL INSECURITY: DO YOU FEEL ANYONE HAS EXPLOITED OR TAKEN ADVANTAGE OF YOU FINANCIALLY OR OF YOUR PERSONAL PROPERTY?: NO

## 2025-05-06 SDOH — SOCIAL STABILITY: SOCIAL NETWORK: HOW OFTEN DO YOU GET TOGETHER WITH FRIENDS OR RELATIVES?: MORE THAN THREE TIMES A WEEK

## 2025-05-06 SDOH — HEALTH STABILITY: MENTAL HEALTH: WISH TO BE DEAD (PAST 1 MONTH): NO

## 2025-05-06 SDOH — SOCIAL STABILITY: SOCIAL INSECURITY: HAVE YOU HAD ANY THOUGHTS OF HARMING ANYONE ELSE?: NO

## 2025-05-06 SDOH — SOCIAL STABILITY: SOCIAL INSECURITY: ARE THERE ANY APPARENT SIGNS OF INJURIES/BEHAVIORS THAT COULD BE RELATED TO ABUSE/NEGLECT?: NO

## 2025-05-06 SDOH — SOCIAL STABILITY: SOCIAL INSECURITY: VERBAL ABUSE: DENIES

## 2025-05-06 SDOH — HEALTH STABILITY: MENTAL HEALTH: WERE YOU ABLE TO COMPLETE ALL THE BEHAVIORAL HEALTH SCREENINGS?: YES

## 2025-05-06 SDOH — HEALTH STABILITY: PHYSICAL HEALTH: ON AVERAGE, HOW MANY DAYS PER WEEK DO YOU ENGAGE IN MODERATE TO STRENUOUS EXERCISE (LIKE A BRISK WALK)?: 7 DAYS

## 2025-05-06 SDOH — HEALTH STABILITY: MENTAL HEALTH: CURRENT SMOKER: 0

## 2025-05-06 SDOH — SOCIAL STABILITY: SOCIAL INSECURITY: PHYSICAL ABUSE: DENIES

## 2025-05-06 SDOH — ECONOMIC STABILITY: HOUSING INSECURITY: DO YOU FEEL UNSAFE GOING BACK TO THE PLACE WHERE YOU ARE LIVING?: NO

## 2025-05-06 SDOH — SOCIAL STABILITY: SOCIAL INSECURITY: DOES ANYONE TRY TO KEEP YOU FROM HAVING/CONTACTING OTHER FRIENDS OR DOING THINGS OUTSIDE YOUR HOME?: NO

## 2025-05-06 SDOH — SOCIAL STABILITY: SOCIAL INSECURITY: ARE YOU OR HAVE YOU BEEN THREATENED OR ABUSED PHYSICALLY, EMOTIONALLY, OR SEXUALLY BY ANYONE?: NO

## 2025-05-06 SDOH — HEALTH STABILITY: MENTAL HEALTH: HAVE YOU USED ANY SUBSTANCES (CANABIS, COCAINE, HEROIN, HALLUCINOGENS, INHALANTS, ETC.) IN THE PAST 12 MONTHS?: NO

## 2025-05-06 ASSESSMENT — ACTIVITIES OF DAILY LIVING (ADL)
HEARING - RIGHT EAR: FUNCTIONAL
PATIENT'S MEMORY ADEQUATE TO SAFELY COMPLETE DAILY ACTIVITIES?: YES
BATHING: INDEPENDENT
JUDGMENT_ADEQUATE_SAFELY_COMPLETE_DAILY_ACTIVITIES: YES
ADEQUATE_TO_COMPLETE_ADL: YES
HEARING - LEFT EAR: FUNCTIONAL
GROOMING: INDEPENDENT
FEEDING YOURSELF: INDEPENDENT
DRESSING YOURSELF: INDEPENDENT
WALKS IN HOME: INDEPENDENT
TOILETING: INDEPENDENT
LACK_OF_TRANSPORTATION: NO

## 2025-05-06 ASSESSMENT — LIFESTYLE VARIABLES
HOW OFTEN DO YOU HAVE A DRINK CONTAINING ALCOHOL: NEVER
HOW MANY STANDARD DRINKS CONTAINING ALCOHOL DO YOU HAVE ON A TYPICAL DAY: PATIENT DOES NOT DRINK
SKIP TO QUESTIONS 9-10: 1
HOW OFTEN DO YOU HAVE 6 OR MORE DRINKS ON ONE OCCASION: NEVER
AUDIT-C TOTAL SCORE: 0
AUDIT-C TOTAL SCORE: 0

## 2025-05-06 ASSESSMENT — PAIN SCALES - GENERAL
PAINLEVEL_OUTOF10: 0 - NO PAIN
PAINLEVEL_OUTOF10: 1
PAINLEVEL_OUTOF10: 4

## 2025-05-06 NOTE — H&P
Saleem Jo is  29 y.o. female, , who is at 37w1d with an UGO of 2025, by Ultrasound dating method.    She presented complaining of nausea , pelvic pain after being treated for UTI   No contractions.  Good fetal movement.  Denies vaginal bleeding. No Rupture of membranes     No headache, no visual changes and No right upper abdominal pain      Obstetrical History:  OB History          1    Para   0    Term   0       0    AB   0    Living   0         SAB   0    IAB   0    Ectopic   0    Multiple   0    Live Births   0                       Medical History[1]  Surgical History[2]  Family History[3]  Social History[4]  Tobacco Use History[5]    Allergies  Patient has no known allergies.     Medications  Prescriptions Prior to Admission[6]        REVIEW OF SYSTEMS:    General: No weight loss, malaise or fevers or other constitutional symptoms.  Neuro: No history of TIA's, stroke,.  No neurological symptoms or problems. No visual changes  Respiratory: No history of respiratory/pulmonary symptoms or problems.  Cardiovascular: No history of cardiovascular symptoms or problems.  GI: No history of GI symptoms or problems.   : No history of UTI in past 6 weeks.  No history of  symptoms or problems.    PHYSICAL EXAMINATION:    Objective    Last Vitals  Temp Pulse Resp BP MAP O2 Sat   36.7 °C (98.1 °F) 87 16 138/82   (!) 94 %       GENERAL: pleasant, female in no apparent distress    ABDOMEN: soft, non-tender and no masses  PELVIC:  Cervix : 1 , 50 % -2 by RN  NST : Cat 1 tracing , +ve acceleration, no deceleration       Lab Review  Hemoglobin   Date Value Ref Range Status   2025 11.4 (L) 12.0 - 16.0 g/dL Final     WBC   Date Value Ref Range Status   2025 11.2 4.4 - 11.3 x10*3/uL Final     Platelets   Date Value Ref Range Status   2025 179 150 - 450 x10*3/uL Final         Assessment and Plan  Saleem Jo is  29 y.o. female,  who is at 37w1d presented with nausea but  found to have severe range BP    PIH labs sent   Labetalol   If get another severe range will start Mg  Discussed with the patient need for IOL for gestation HTN/ severe preeclampsia based on BP criteria       Plan:         Admit to L& D   NO GBS prophylaxis  Epidural PRN  Oxytocin if there is no change in cervix  Continuous fetal monitoring      Jered Thorne MD         [1]   Past Medical History:  Diagnosis Date    Anxiety    [2] No past surgical history on file.  [3]   Family History  Problem Relation Name Age of Onset    Hypertension Mother Genie     Diabetes Father      Breast cancer Father's Sister Clovis     Vision loss Maternal Grandmother Charley     Breast cancer Paternal Grandmother Zohreh     Breast cancer Father's Sister Clovis    [4]   Social History  Tobacco Use    Smoking status: Never    Smokeless tobacco: Never   Vaping Use    Vaping status: Never Used   Substance Use Topics    Alcohol use: Not Currently    Drug use: Not Currently     Types: Marijuana     Comment: Quit when I found out I was pregnant   [5]   Social History  Tobacco Use   Smoking Status Never   Smokeless Tobacco Never   [6]   Medications Prior to Admission   Medication Sig Dispense Refill Last Dose/Taking    aspirin 81 mg chewable tablet Chew 1 tablet (81 mg) 2 times a day. 300 tablet 0 More than a month    nitrofurantoin, macrocrystal-monohydrate, (Macrobid) 100 mg capsule Take 1 capsule (100 mg) by mouth 2 times a day for 5 days. 10 capsule 0 5/6/2025    prenatal no115/iron/folic acid (PRENATAL 19 ORAL) Take by mouth.   5/6/2025

## 2025-05-06 NOTE — TELEPHONE ENCOUNTER
Patient called the office today stating about an hour after taking Macrobid for UTI she started sweating profusely and became nauseated. She is also having cramping in her side and back, and just does not feel well at all.  She is wondering if she should c/t with medication or change to something else. Advised patient to hold off on taking until I speak with Dr. Brunner d/t reaction. Please advise if you would like to her c/t or change medication.

## 2025-05-07 ENCOUNTER — ANESTHESIA EVENT (OUTPATIENT)
Dept: OBSTETRICS AND GYNECOLOGY | Facility: HOSPITAL | Age: 30
End: 2025-05-07
Payer: OTHER GOVERNMENT

## 2025-05-07 LAB — TREPONEMA PALLIDUM IGG+IGM AB [PRESENCE] IN SERUM OR PLASMA BY IMMUNOASSAY: NONREACTIVE

## 2025-05-07 PROCEDURE — 3700000014 EPIDURAL BLOCK: Performed by: NURSE ANESTHETIST, CERTIFIED REGISTERED

## 2025-05-07 PROCEDURE — 7210000002 HC LABOR PER HOUR

## 2025-05-07 PROCEDURE — 1220000001 HC OB SEMI-PRIVATE ROOM DAILY

## 2025-05-07 PROCEDURE — 2500000004 HC RX 250 GENERAL PHARMACY W/ HCPCS (ALT 636 FOR OP/ED): Mod: JZ | Performed by: NURSE ANESTHETIST, CERTIFIED REGISTERED

## 2025-05-07 PROCEDURE — 2500000004 HC RX 250 GENERAL PHARMACY W/ HCPCS (ALT 636 FOR OP/ED): Mod: JW | Performed by: NURSE ANESTHETIST, CERTIFIED REGISTERED

## 2025-05-07 PROCEDURE — 2500000004 HC RX 250 GENERAL PHARMACY W/ HCPCS (ALT 636 FOR OP/ED): Mod: JZ | Performed by: OBSTETRICS & GYNECOLOGY

## 2025-05-07 PROCEDURE — 2500000002 HC RX 250 W HCPCS SELF ADMINISTERED DRUGS (ALT 637 FOR MEDICARE OP, ALT 636 FOR OP/ED): Performed by: OBSTETRICS & GYNECOLOGY

## 2025-05-07 PROCEDURE — 2720000007 HC OR 272 NO HCPCS

## 2025-05-07 RX ORDER — OXYTOCIN/0.9 % SODIUM CHLORIDE 30/500 ML
2-30 PLASTIC BAG, INJECTION (ML) INTRAVENOUS CONTINUOUS
Status: DISCONTINUED | OUTPATIENT
Start: 2025-05-07 | End: 2025-05-08

## 2025-05-07 RX ORDER — LIDOCAINE HCL/EPINEPHRINE/PF 2%-1:200K
VIAL (ML) INJECTION AS NEEDED
Status: DISCONTINUED | OUTPATIENT
Start: 2025-05-07 | End: 2025-05-08

## 2025-05-07 RX ORDER — FENTANYL/ROPIVACAINE/NS/PF 2MCG/ML-.2
0-25 PLASTIC BAG, INJECTION (ML) INJECTION CONTINUOUS
Status: DISCONTINUED | OUTPATIENT
Start: 2025-05-07 | End: 2025-05-08

## 2025-05-07 RX ADMIN — Medication 10 ML/HR: at 22:45

## 2025-05-07 RX ADMIN — SODIUM CHLORIDE, SODIUM LACTATE, POTASSIUM CHLORIDE, AND CALCIUM CHLORIDE 75 ML/HR: 600; 310; 30; 20 INJECTION, SOLUTION INTRAVENOUS at 18:57

## 2025-05-07 RX ADMIN — Medication 3 ML: at 22:42

## 2025-05-07 RX ADMIN — Medication 3 ML: at 22:33

## 2025-05-07 RX ADMIN — SULFAMETHOXAZOLE AND TRIMETHOPRIM 1 TABLET: 800; 160 TABLET ORAL at 08:18

## 2025-05-07 RX ADMIN — MISOPROSTOL 25 MCG: 100 TABLET ORAL at 06:51

## 2025-05-07 RX ADMIN — SODIUM CHLORIDE, SODIUM LACTATE, POTASSIUM CHLORIDE, AND CALCIUM CHLORIDE 500 ML: 600; 310; 30; 20 INJECTION, SOLUTION INTRAVENOUS at 18:26

## 2025-05-07 RX ADMIN — MISOPROSTOL 25 MCG: 100 TABLET ORAL at 04:57

## 2025-05-07 RX ADMIN — MISOPROSTOL 25 MCG: 100 TABLET ORAL at 00:50

## 2025-05-07 RX ADMIN — MISOPROSTOL 25 MCG: 100 TABLET ORAL at 02:50

## 2025-05-07 RX ADMIN — SODIUM CHLORIDE, SODIUM LACTATE, POTASSIUM CHLORIDE, AND CALCIUM CHLORIDE 75 ML/HR: 600; 310; 30; 20 INJECTION, SOLUTION INTRAVENOUS at 11:15

## 2025-05-07 RX ADMIN — LIDOCAINE HYDROCHLORIDE AND EPINEPHRINE 3 ML: 20; 5 INJECTION, SOLUTION EPIDURAL; INFILTRATION; INTRACAUDAL; PERINEURAL at 22:29

## 2025-05-07 RX ADMIN — Medication 2 MILLI-UNITS/MIN: at 11:16

## 2025-05-07 RX ADMIN — SODIUM CHLORIDE, SODIUM LACTATE, POTASSIUM CHLORIDE, AND CALCIUM CHLORIDE 75 ML/HR: 600; 310; 30; 20 INJECTION, SOLUTION INTRAVENOUS at 22:32

## 2025-05-07 RX ADMIN — SULFAMETHOXAZOLE AND TRIMETHOPRIM 1 TABLET: 800; 160 TABLET ORAL at 21:37

## 2025-05-07 ASSESSMENT — PAIN SCALES - GENERAL
PAINLEVEL_OUTOF10: 0 - NO PAIN
PAINLEVEL_OUTOF10: 5 - MODERATE PAIN
PAINLEVEL_OUTOF10: 0 - NO PAIN
PAINLEVEL_OUTOF10: 1
PAINLEVEL_OUTOF10: 0 - NO PAIN
PAINLEVEL_OUTOF10: 2
PAINLEVEL_OUTOF10: 0 - NO PAIN
PAINLEVEL_OUTOF10: 3
PAINLEVEL_OUTOF10: 0 - NO PAIN

## 2025-05-07 NOTE — CARE PLAN
The patient's goals for the shift include safe delivery    The clinical goals for the shift include safe delivery and stable vital signs        Problem: Antepartum  Goal: Maintain pregnancy as long as maternal and/or fetal condition is stable  Outcome: Progressing     Problem: Hypertensive Disorder of Pregnancy (HDP)  Goal: Minimal s/sx of HDP and BP<160/110  Outcome: Progressing     Problem: Pain - Adult  Goal: Verbalizes/displays adequate comfort level or baseline comfort level  Outcome: Progressing     Problem: Antepartum  Goal: FHR remains reassuring  Outcome: Progressing

## 2025-05-07 NOTE — PROGRESS NOTES
Intrapartum Progress Note    Assessment/Plan   Saleem Jo is a 29 y.o.  at 37w2d. UGO: 2025, by Ultrasound.     Preeclampsia - continue induction.  No HA or vision changes or RUQ pain.  Bps stable.    Assessment & Plan  Severe preeclampsia, third trimester ([facility])    Pregnancy Problems (from 24 to present)       Problem Noted Diagnosed Resolved    Severe preeclampsia, third trimester ([facility]) 2025 by Jered Thorne MD  No    Priority:  Medium       12 weeks gestation of pregnancy ([facility]) 2024 by Aurora Loyd DO  No    Priority:  Medium       Overview Signed 2024 12:06 PM by Aurora Loyd,    Desired provider in labor: [] CNM  [] Physician  [] Blood Products: [x] Yes, accepts [] No, needs counseling  [x] Initial BMI: 25.44   [] Prenatal Labs:   [] Cervical Cancer Screening up to date  [] Rh status:   [] Genetic Screening:    [] NT US: (11-13 wks)  [] Baby ASA (if indicated):  [] Pregnancy dated by:     [] Anatomy US: (19-20 wks)  [] Federal Sterilization consent signed (if indicated):  [] 1hr GCT at 24-28wks:  [] Rhogam (if indicated):   [] Fetal Surveillance (if indicated):  [] Tdap (27-32 wks, may be given up to 36 wks if initial window missed):   [] RSV (32-36 wks) (Sept. to end of ):   [] Flu Vaccine:    [] Breastfeeding:  [] Postpartum Birth control method:   [] GBS at 36 - 37 wks:  [] 39 weeks discussion of IOL vs. Expectant management:  [] Mode of delivery ( anticipated ):                   Subjective   Patient w/o complaints. No HA, vision changes, or upper abdominal pain. No LOF or bleeding. Does not feel her contractions.    Objective   Last Vitals:  Temp Pulse Resp BP MAP Pulse Ox   36.2 °C (97.2 °F) 96 18 (!) 142/79 105 98 %     Vitals Min/Max Last 24 Hours:  Temp  Min: 36.2 °C (97.2 °F)  Max: 36.9 °C (98.4 °F)  Pulse  Min: 73  Max: 112  Resp  Min: 15  Max: 19  BP  Min: 114/58  Max: 184/92  MAP (mmHg)  Min: 81  Max: 128    Intake/Output:  No  intake or output data in the 24 hours ending 05/07/25 1008    Physical Examination:  GENERAL: Examination reveals a obese, gravid female in no acute distress. She is alert and cooperative.  FHR is 123 BPM, with Accelerations, and a   tracing.    [place] reading:  irregular  Contractions are mild to palpation   CERVIX: 1 cm dilated, 70 % effaced, -2 station; MEMBRANES are Intact    Chaperone Present: Yes.  Chaperone Name/Title: Significant other  Examination Chaperoned: Entire Physical Exam    Lab Review:  Lab Results   Component Value Date    ABO O 05/06/2025    LABRH POS 05/06/2025    ABSCRN NEG 05/06/2025     Lab Results   Component Value Date    WBC 11.2 05/06/2025    HGB 11.4 (L) 05/06/2025    HCT 34.7 (L) 05/06/2025     05/06/2025     0   Lab Value Date/Time    GRPBSTREP SEE NOTE 04/30/2025 0929     Lab Results   Component Value Date    GLUCOSE 109 (H) 05/06/2025     05/06/2025    K 3.7 05/06/2025     (H) 05/06/2025    CO2 20 (L) 05/06/2025    ANIONGAP 13 05/06/2025    BUN 7 05/06/2025    CREATININE 0.60 05/06/2025    EGFR >90 05/06/2025    CALCIUM 9.0 05/06/2025    ALBUMIN 3.5 05/06/2025    PROT 6.4 05/06/2025    ALKPHOS 100 05/06/2025    ALT 14 05/06/2025    AST 14 05/06/2025    BILITOT 0.3 05/06/2025     0   Lab Value Date/Time    UTPCR 0.14 05/06/2025 1821

## 2025-05-07 NOTE — CARE PLAN
The patient's goals for the shift include VSS, IOL, healthy mom and baby,     The clinical goals for the shift include VSS, IOL, healthy mom and baby,       Problem: Antepartum  Goal: Maintain pregnancy as long as maternal and/or fetal condition is stable  Outcome: Progressing  Goal: Avoid/minimize constipation  Outcome: Progressing  Goal: No decrease in circulation/VTE  Outcome: Progressing  Goal: FHR remains reassuring  Outcome: Progressing  Goal: Minimize anxiety/maximize coping  Outcome: Progressing     Problem: Vaginal Birth or  Section  Goal: Fetal and maternal status remain reassuring during the birth process  Outcome: Progressing  Goal: Tolerate CRB for IOL placement maintenance until dislodgement/removal 12hrs after placement  Outcome: Progressing  Goal: Prevention of malpresentation/labor dystocia through delivery  Outcome: Progressing  Goal: Demonstrates labor coping techniques through delivery  Outcome: Progressing  Goal: Minimal s/sx of HDP and BP<160/110  Outcome: Progressing  Goal: No s/sx of infection through recovery  Outcome: Progressing  Goal: No s/sx of hemorrhage through recovery  Outcome: Progressing     Problem: Hypertensive Disorder of Pregnancy (HDP)  Goal: Minimal s/sx of HDP and BP<160/110  Outcome: Progressing  Goal: Adequate urine output (0.5 ml/kg/hr)  Outcome: Progressing     Problem: Nausea/Vomiting  Goal: Adequate urine output (0.5 ml/kg/hr)  Outcome: Progressing  Goal: Free from nausea/vomiting  Outcome: Progressing  Goal: Tolerates prescribed diet  Outcome: Progressing  Goal: Weight maintenance or gain  Outcome: Progressing  Goal: Achieve/maintain normal electrolyte level  Outcome: Progressing     Problem: Pain - Adult  Goal: Verbalizes/displays adequate comfort level or baseline comfort level  Outcome: Progressing     Problem: Safety - Adult  Goal: Free from fall injury  Outcome: Progressing     Problem: Discharge Planning  Goal: Discharge to home or other facility  with appropriate resources  Outcome: Progressing

## 2025-05-07 NOTE — ANESTHESIA PREPROCEDURE EVALUATION
"Patient: Saleem Jo \"Rebecca\"    Evaluation Method: In-person visit    Procedure Information    Date: 05/06/25  Procedure: Labor Consult         Relevant Problems   Anesthesia (within normal limits)      Cardiac   (+) Severe preeclampsia, third trimester ([facility])      Pulmonary (within normal limits)      Neuro (within normal limits)      GI (within normal limits)  Mild GERD      /Renal (within normal limits)      Liver (within normal limits)      Endocrine (within normal limits)      Hematology (within normal limits)      Musculoskeletal (within normal limits)  Wrist and ankle pain      HEENT (within normal limits)      ID (within normal limits)      Skin (within normal limits)      GYN   (+) 12 weeks gestation of pregnancy ([facility])       Clinical information reviewed:   Tobacco  Allergies  Meds   Med Hx  Surg Hx   Fam Hx          NPO Detail:  NPO/Void Status  Date of Last Liquid: 05/06/25  Date of Last Solid: 05/06/25         OB/GYN     Physical Exam    Airway  Mallampati: II  TM distance: >3 FB  Neck ROM: full  Mouth opening: 3 or more finger widths     Cardiovascular - normal exam   Dental - normal exam     Pulmonary - normal exam   Abdominal - normal exam           Anesthesia Plan    History of general anesthesia?: no  History of complications of general anesthesia?: unknown/emergency    ASA 2     epidural     The patient is not a current smoker.    Anesthetic plan and risks discussed with patient.  Use of blood products discussed with patient who consented to blood products.    Plan discussed with CRNA.        "

## 2025-05-07 NOTE — CARE PLAN
The patient's goals for the shift include safe delivery    The clinical goals for the shift include induction of labor,maintain stable vital signs      Problem: Antepartum  Goal: Maintain pregnancy as long as maternal and/or fetal condition is stable  Outcome: Progressing     Problem: Antepartum  Goal: FHR remains reassuring  Outcome: Progressing     Problem: Vaginal Birth or  Section  Goal: Minimal s/sx of HDP and BP<160/110  Outcome: Progressing

## 2025-05-08 ENCOUNTER — APPOINTMENT (OUTPATIENT)
Dept: OBSTETRICS AND GYNECOLOGY | Facility: CLINIC | Age: 30
End: 2025-05-08
Payer: OTHER GOVERNMENT

## 2025-05-08 ENCOUNTER — SURGERY (OUTPATIENT)
Age: 30
End: 2025-05-08
Payer: OTHER GOVERNMENT

## 2025-05-08 PROCEDURE — 2500000004 HC RX 250 GENERAL PHARMACY W/ HCPCS (ALT 636 FOR OP/ED): Performed by: STUDENT IN AN ORGANIZED HEALTH CARE EDUCATION/TRAINING PROGRAM

## 2025-05-08 PROCEDURE — 2500000002 HC RX 250 W HCPCS SELF ADMINISTERED DRUGS (ALT 637 FOR MEDICARE OP, ALT 636 FOR OP/ED): Performed by: OBSTETRICS & GYNECOLOGY

## 2025-05-08 PROCEDURE — 3700000014 HC AN EPIDURAL BLOCK CHARGE: Performed by: STUDENT IN AN ORGANIZED HEALTH CARE EDUCATION/TRAINING PROGRAM

## 2025-05-08 PROCEDURE — 7210000002 HC LABOR PER HOUR

## 2025-05-08 PROCEDURE — A4649 SURGICAL SUPPLIES: HCPCS | Performed by: STUDENT IN AN ORGANIZED HEALTH CARE EDUCATION/TRAINING PROGRAM

## 2025-05-08 PROCEDURE — 2720000007 HC OR 272 NO HCPCS: Performed by: STUDENT IN AN ORGANIZED HEALTH CARE EDUCATION/TRAINING PROGRAM

## 2025-05-08 PROCEDURE — 2500000001 HC RX 250 WO HCPCS SELF ADMINISTERED DRUGS (ALT 637 FOR MEDICARE OP): Performed by: STUDENT IN AN ORGANIZED HEALTH CARE EDUCATION/TRAINING PROGRAM

## 2025-05-08 PROCEDURE — 3E033VJ INTRODUCTION OF OTHER HORMONE INTO PERIPHERAL VEIN, PERCUTANEOUS APPROACH: ICD-10-PCS | Performed by: STUDENT IN AN ORGANIZED HEALTH CARE EDUCATION/TRAINING PROGRAM

## 2025-05-08 PROCEDURE — 2500000004 HC RX 250 GENERAL PHARMACY W/ HCPCS (ALT 636 FOR OP/ED): Mod: JZ | Performed by: NURSE ANESTHETIST, CERTIFIED REGISTERED

## 2025-05-08 PROCEDURE — 2500000004 HC RX 250 GENERAL PHARMACY W/ HCPCS (ALT 636 FOR OP/ED): Mod: JZ | Performed by: STUDENT IN AN ORGANIZED HEALTH CARE EDUCATION/TRAINING PROGRAM

## 2025-05-08 PROCEDURE — 2500000005 HC RX 250 GENERAL PHARMACY W/O HCPCS: Performed by: STUDENT IN AN ORGANIZED HEALTH CARE EDUCATION/TRAINING PROGRAM

## 2025-05-08 PROCEDURE — 7100000016 HC LABOR RECOVERY PER HOUR

## 2025-05-08 PROCEDURE — 3E0DXGC INTRODUCTION OF OTHER THERAPEUTIC SUBSTANCE INTO MOUTH AND PHARYNX, EXTERNAL APPROACH: ICD-10-PCS | Performed by: STUDENT IN AN ORGANIZED HEALTH CARE EDUCATION/TRAINING PROGRAM

## 2025-05-08 PROCEDURE — 2500000004 HC RX 250 GENERAL PHARMACY W/ HCPCS (ALT 636 FOR OP/ED): Performed by: NURSE ANESTHETIST, CERTIFIED REGISTERED

## 2025-05-08 PROCEDURE — 2500000004 HC RX 250 GENERAL PHARMACY W/ HCPCS (ALT 636 FOR OP/ED): Mod: JZ | Performed by: ANESTHESIOLOGY

## 2025-05-08 PROCEDURE — 2500000004 HC RX 250 GENERAL PHARMACY W/ HCPCS (ALT 636 FOR OP/ED): Mod: JZ | Performed by: OBSTETRICS & GYNECOLOGY

## 2025-05-08 PROCEDURE — 10907ZC DRAINAGE OF AMNIOTIC FLUID, THERAPEUTIC FROM PRODUCTS OF CONCEPTION, VIA NATURAL OR ARTIFICIAL OPENING: ICD-10-PCS | Performed by: STUDENT IN AN ORGANIZED HEALTH CARE EDUCATION/TRAINING PROGRAM

## 2025-05-08 PROCEDURE — 1220000001 HC OB SEMI-PRIVATE ROOM DAILY

## 2025-05-08 PROCEDURE — 59514 CESAREAN DELIVERY ONLY: CPT | Performed by: STUDENT IN AN ORGANIZED HEALTH CARE EDUCATION/TRAINING PROGRAM

## 2025-05-08 PROCEDURE — 2500000005 HC RX 250 GENERAL PHARMACY W/O HCPCS: Performed by: NURSE ANESTHETIST, CERTIFIED REGISTERED

## 2025-05-08 PROCEDURE — 10H07YZ INSERTION OF OTHER DEVICE INTO PRODUCTS OF CONCEPTION, VIA NATURAL OR ARTIFICIAL OPENING: ICD-10-PCS | Performed by: STUDENT IN AN ORGANIZED HEALTH CARE EDUCATION/TRAINING PROGRAM

## 2025-05-08 PROCEDURE — 7100000016 HC LABOR RECOVERY PER HOUR: Performed by: STUDENT IN AN ORGANIZED HEALTH CARE EDUCATION/TRAINING PROGRAM

## 2025-05-08 PROCEDURE — 51701 INSERT BLADDER CATHETER: CPT

## 2025-05-08 PROCEDURE — 2500000001 HC RX 250 WO HCPCS SELF ADMINISTERED DRUGS (ALT 637 FOR MEDICARE OP): Performed by: NURSE ANESTHETIST, CERTIFIED REGISTERED

## 2025-05-08 RX ORDER — LIDOCAINE 560 MG/1
1 PATCH PERCUTANEOUS; TOPICAL; TRANSDERMAL
Status: DISCONTINUED | OUTPATIENT
Start: 2025-05-08 | End: 2025-05-11 | Stop reason: HOSPADM

## 2025-05-08 RX ORDER — OXYTOCIN 10 [USP'U]/ML
INJECTION, SOLUTION INTRAMUSCULAR; INTRAVENOUS AS NEEDED
Status: DISCONTINUED | OUTPATIENT
Start: 2025-05-08 | End: 2025-05-08 | Stop reason: HOSPADM

## 2025-05-08 RX ORDER — ACETAMINOPHEN 325 MG/1
975 TABLET ORAL EVERY 6 HOURS
Status: DISCONTINUED | OUTPATIENT
Start: 2025-05-09 | End: 2025-05-11 | Stop reason: HOSPADM

## 2025-05-08 RX ORDER — ADHESIVE BANDAGE
10 BANDAGE TOPICAL
Status: DISCONTINUED | OUTPATIENT
Start: 2025-05-08 | End: 2025-05-11 | Stop reason: HOSPADM

## 2025-05-08 RX ORDER — LABETALOL HYDROCHLORIDE 5 MG/ML
20 INJECTION, SOLUTION INTRAVENOUS ONCE AS NEEDED
Status: DISCONTINUED | OUTPATIENT
Start: 2025-05-08 | End: 2025-05-11 | Stop reason: HOSPADM

## 2025-05-08 RX ORDER — CARBOPROST TROMETHAMINE 250 UG/ML
250 INJECTION, SOLUTION INTRAMUSCULAR ONCE AS NEEDED
Status: DISCONTINUED | OUTPATIENT
Start: 2025-05-08 | End: 2025-05-11 | Stop reason: HOSPADM

## 2025-05-08 RX ORDER — OXYCODONE HYDROCHLORIDE 5 MG/1
10 TABLET ORAL EVERY 4 HOURS PRN
Status: DISCONTINUED | OUTPATIENT
Start: 2025-05-09 | End: 2025-05-11 | Stop reason: HOSPADM

## 2025-05-08 RX ORDER — SIMETHICONE 80 MG
80 TABLET,CHEWABLE ORAL 4 TIMES DAILY PRN
Status: DISCONTINUED | OUTPATIENT
Start: 2025-05-08 | End: 2025-05-11 | Stop reason: HOSPADM

## 2025-05-08 RX ORDER — DIPHENHYDRAMINE HYDROCHLORIDE 50 MG/ML
25 INJECTION, SOLUTION INTRAMUSCULAR; INTRAVENOUS EVERY 4 HOURS PRN
Status: DISCONTINUED | OUTPATIENT
Start: 2025-05-08 | End: 2025-05-11 | Stop reason: HOSPADM

## 2025-05-08 RX ORDER — BUPIVACAINE HYDROCHLORIDE 2.5 MG/ML
INJECTION, SOLUTION EPIDURAL; INFILTRATION; INTRACAUDAL; PERINEURAL
Status: COMPLETED
Start: 2025-05-08 | End: 2025-05-08

## 2025-05-08 RX ORDER — FAMOTIDINE 10 MG/ML
INJECTION INTRAVENOUS AS NEEDED
Status: DISCONTINUED | OUTPATIENT
Start: 2025-05-08 | End: 2025-05-08

## 2025-05-08 RX ORDER — POLYETHYLENE GLYCOL 3350 17 G/17G
17 POWDER, FOR SOLUTION ORAL 2 TIMES DAILY PRN
Status: DISCONTINUED | OUTPATIENT
Start: 2025-05-08 | End: 2025-05-11 | Stop reason: HOSPADM

## 2025-05-08 RX ORDER — MORPHINE SULFATE 0.5 MG/ML
INJECTION, SOLUTION EPIDURAL; INTRATHECAL; INTRAVENOUS AS NEEDED
Status: DISCONTINUED | OUTPATIENT
Start: 2025-05-08 | End: 2025-05-08

## 2025-05-08 RX ORDER — FENTANYL CITRATE 50 UG/ML
INJECTION, SOLUTION INTRAMUSCULAR; INTRAVENOUS
Status: COMPLETED
Start: 2025-05-08 | End: 2025-05-08

## 2025-05-08 RX ORDER — NALOXONE HYDROCHLORIDE 0.4 MG/ML
0.1 INJECTION, SOLUTION INTRAMUSCULAR; INTRAVENOUS; SUBCUTANEOUS EVERY 5 MIN PRN
Status: DISCONTINUED | OUTPATIENT
Start: 2025-05-08 | End: 2025-05-11 | Stop reason: HOSPADM

## 2025-05-08 RX ORDER — OXYCODONE HYDROCHLORIDE 5 MG/1
5 TABLET ORAL EVERY 4 HOURS PRN
Status: DISCONTINUED | OUTPATIENT
Start: 2025-05-09 | End: 2025-05-11 | Stop reason: HOSPADM

## 2025-05-08 RX ORDER — LOPERAMIDE HYDROCHLORIDE 2 MG/1
4 CAPSULE ORAL EVERY 2 HOUR PRN
Status: DISCONTINUED | OUTPATIENT
Start: 2025-05-08 | End: 2025-05-11 | Stop reason: HOSPADM

## 2025-05-08 RX ORDER — AZITHROMYCIN MONOHYDRATE 500 MG/5ML
INJECTION, POWDER, LYOPHILIZED, FOR SOLUTION INTRAVENOUS AS NEEDED
Status: DISCONTINUED | OUTPATIENT
Start: 2025-05-08 | End: 2025-05-08

## 2025-05-08 RX ORDER — CARBOPROST TROMETHAMINE 250 UG/ML
INJECTION, SOLUTION INTRAMUSCULAR AS NEEDED
Status: DISCONTINUED | OUTPATIENT
Start: 2025-05-08 | End: 2025-05-08 | Stop reason: HOSPADM

## 2025-05-08 RX ORDER — DIPHENHYDRAMINE HCL 25 MG
25 TABLET ORAL EVERY 4 HOURS PRN
Status: DISCONTINUED | OUTPATIENT
Start: 2025-05-08 | End: 2025-05-11 | Stop reason: HOSPADM

## 2025-05-08 RX ORDER — TRANEXAMIC ACID 100 MG/ML
INJECTION, SOLUTION INTRAVENOUS AS NEEDED
Status: DISCONTINUED | OUTPATIENT
Start: 2025-05-08 | End: 2025-05-08

## 2025-05-08 RX ORDER — CEFAZOLIN SODIUM 2 G/100ML
2 INJECTION, SOLUTION INTRAVENOUS ONCE
Status: COMPLETED | OUTPATIENT
Start: 2025-05-08 | End: 2025-05-08

## 2025-05-08 RX ORDER — METHYLERGONOVINE MALEATE 0.2 MG/ML
0.2 INJECTION INTRAVENOUS ONCE AS NEEDED
Status: DISCONTINUED | OUTPATIENT
Start: 2025-05-08 | End: 2025-05-11 | Stop reason: HOSPADM

## 2025-05-08 RX ORDER — ONDANSETRON HYDROCHLORIDE 2 MG/ML
4 INJECTION, SOLUTION INTRAVENOUS EVERY 6 HOURS PRN
Status: DISCONTINUED | OUTPATIENT
Start: 2025-05-08 | End: 2025-05-11 | Stop reason: HOSPADM

## 2025-05-08 RX ORDER — METOCLOPRAMIDE HYDROCHLORIDE 5 MG/ML
INJECTION INTRAMUSCULAR; INTRAVENOUS AS NEEDED
Status: DISCONTINUED | OUTPATIENT
Start: 2025-05-08 | End: 2025-05-08

## 2025-05-08 RX ORDER — KETOROLAC TROMETHAMINE 30 MG/ML
30 INJECTION, SOLUTION INTRAMUSCULAR; INTRAVENOUS EVERY 6 HOURS
Status: ACTIVE | OUTPATIENT
Start: 2025-05-09 | End: 2025-05-09

## 2025-05-08 RX ORDER — PHENYLEPHRINE HCL IN 0.9% NACL 1 MG/10 ML
SYRINGE (ML) INTRAVENOUS AS NEEDED
Status: DISCONTINUED | OUTPATIENT
Start: 2025-05-08 | End: 2025-05-08

## 2025-05-08 RX ORDER — FENTANYL CITRATE 50 UG/ML
INJECTION, SOLUTION INTRAMUSCULAR; INTRAVENOUS AS NEEDED
Status: DISCONTINUED | OUTPATIENT
Start: 2025-05-08 | End: 2025-05-08

## 2025-05-08 RX ORDER — SODIUM CITRATE AND CITRIC ACID MONOHYDRATE 334; 500 MG/5ML; MG/5ML
30 SOLUTION ORAL ONCE
Status: COMPLETED | OUTPATIENT
Start: 2025-05-08 | End: 2025-05-08

## 2025-05-08 RX ORDER — OXYTOCIN 10 [USP'U]/ML
10 INJECTION, SOLUTION INTRAMUSCULAR; INTRAVENOUS ONCE AS NEEDED
Status: DISCONTINUED | OUTPATIENT
Start: 2025-05-08 | End: 2025-05-11 | Stop reason: HOSPADM

## 2025-05-08 RX ORDER — HYDRALAZINE HYDROCHLORIDE 20 MG/ML
5 INJECTION INTRAMUSCULAR; INTRAVENOUS ONCE AS NEEDED
Status: DISCONTINUED | OUTPATIENT
Start: 2025-05-08 | End: 2025-05-11 | Stop reason: HOSPADM

## 2025-05-08 RX ORDER — BUPIVACAINE HYDROCHLORIDE 2.5 MG/ML
INJECTION, SOLUTION EPIDURAL; INFILTRATION; INTRACAUDAL; PERINEURAL AS NEEDED
Status: DISCONTINUED | OUTPATIENT
Start: 2025-05-08 | End: 2025-05-08

## 2025-05-08 RX ORDER — ACETAMINOPHEN 120 MG/1
SUPPOSITORY RECTAL AS NEEDED
Status: DISCONTINUED | OUTPATIENT
Start: 2025-05-08 | End: 2025-05-08

## 2025-05-08 RX ORDER — TRANEXAMIC ACID 10 MG/ML
1000 INJECTION, SOLUTION INTRAVENOUS ONCE AS NEEDED
Status: DISCONTINUED | OUTPATIENT
Start: 2025-05-08 | End: 2025-05-11 | Stop reason: HOSPADM

## 2025-05-08 RX ORDER — ONDANSETRON 4 MG/1
4 TABLET, FILM COATED ORAL EVERY 6 HOURS PRN
Status: DISCONTINUED | OUTPATIENT
Start: 2025-05-08 | End: 2025-05-11 | Stop reason: HOSPADM

## 2025-05-08 RX ORDER — OXYTOCIN/0.9 % SODIUM CHLORIDE 30/500 ML
60 PLASTIC BAG, INJECTION (ML) INTRAVENOUS ONCE AS NEEDED
Status: COMPLETED | OUTPATIENT
Start: 2025-05-08 | End: 2025-05-08

## 2025-05-08 RX ORDER — KETOROLAC TROMETHAMINE 30 MG/ML
INJECTION, SOLUTION INTRAMUSCULAR; INTRAVENOUS AS NEEDED
Status: DISCONTINUED | OUTPATIENT
Start: 2025-05-08 | End: 2025-05-08

## 2025-05-08 RX ORDER — IBUPROFEN 600 MG/1
600 TABLET ORAL EVERY 6 HOURS
Status: DISCONTINUED | OUTPATIENT
Start: 2025-05-09 | End: 2025-05-11 | Stop reason: HOSPADM

## 2025-05-08 RX ORDER — MISOPROSTOL 200 UG/1
800 TABLET ORAL ONCE AS NEEDED
Status: DISCONTINUED | OUTPATIENT
Start: 2025-05-08 | End: 2025-05-11 | Stop reason: HOSPADM

## 2025-05-08 RX ORDER — PHENYLEPHRINE HCL IN 0.9% NACL 0.4MG/10ML
SYRINGE (ML) INTRAVENOUS AS NEEDED
Status: DISCONTINUED | OUTPATIENT
Start: 2025-05-08 | End: 2025-05-08

## 2025-05-08 RX ADMIN — ONDANSETRON 4 MG: 2 INJECTION INTRAMUSCULAR; INTRAVENOUS at 19:16

## 2025-05-08 RX ADMIN — BUPIVACAINE HYDROCHLORIDE 5 ML: 2.5 INJECTION, SOLUTION EPIDURAL; INFILTRATION; INTRACAUDAL; PERINEURAL at 18:20

## 2025-05-08 RX ADMIN — SODIUM CHLORIDE, POTASSIUM CHLORIDE, SODIUM LACTATE AND CALCIUM CHLORIDE 500 ML: 600; 310; 30; 20 INJECTION, SOLUTION INTRAVENOUS at 15:30

## 2025-05-08 RX ADMIN — ONDANSETRON 4 MG: 2 INJECTION INTRAMUSCULAR; INTRAVENOUS at 04:24

## 2025-05-08 RX ADMIN — Medication 100 MCG: at 19:07

## 2025-05-08 RX ADMIN — SODIUM CHLORIDE, SODIUM LACTATE, POTASSIUM CHLORIDE, AND CALCIUM CHLORIDE 75 ML/HR: 600; 310; 30; 20 INJECTION, SOLUTION INTRAVENOUS at 06:13

## 2025-05-08 RX ADMIN — Medication 10 ML/HR: at 06:13

## 2025-05-08 RX ADMIN — FENTANYL CITRATE 50 MCG: 0.05 INJECTION, SOLUTION INTRAMUSCULAR; INTRAVENOUS at 15:27

## 2025-05-08 RX ADMIN — METOCLOPRAMIDE HYDROCHLORIDE 10 MG: 5 INJECTION INTRAMUSCULAR; INTRAVENOUS at 18:23

## 2025-05-08 RX ADMIN — OXYTOCIN 600 MILLI-UNITS/MIN: 10 INJECTION, SOLUTION INTRAMUSCULAR; INTRAVENOUS at 18:49

## 2025-05-08 RX ADMIN — Medication 80 MCG: at 18:37

## 2025-05-08 RX ADMIN — Medication 30 ML: at 18:11

## 2025-05-08 RX ADMIN — SULFAMETHOXAZOLE AND TRIMETHOPRIM 1 TABLET: 800; 160 TABLET ORAL at 08:54

## 2025-05-08 RX ADMIN — SODIUM CHLORIDE, SODIUM LACTATE, POTASSIUM CHLORIDE, AND CALCIUM CHLORIDE: 600; 310; 30; 20 INJECTION, SOLUTION INTRAVENOUS at 19:25

## 2025-05-08 RX ADMIN — Medication 60 MILLI-UNITS/MIN: at 19:50

## 2025-05-08 RX ADMIN — Medication 12 ML/HR: at 13:42

## 2025-05-08 RX ADMIN — TRANEXAMIC ACID 1000 MG: 100 INJECTION, SOLUTION INTRAVENOUS at 19:05

## 2025-05-08 RX ADMIN — ACETAMINOPHEN 650 MG: 120 SUPPOSITORY RECTAL at 19:37

## 2025-05-08 RX ADMIN — ONDANSETRON 4 MG: 2 INJECTION INTRAMUSCULAR; INTRAVENOUS at 18:50

## 2025-05-08 RX ADMIN — MORPHINE SULFATE 2.5 MG: 0.5 INJECTION, SOLUTION EPIDURAL; INTRATHECAL; INTRAVENOUS at 19:37

## 2025-05-08 RX ADMIN — KETOROLAC TROMETHAMINE 30 MG: 30 INJECTION, SOLUTION INTRAMUSCULAR at 19:37

## 2025-05-08 RX ADMIN — CARBOPROST TROMETHAMINE 250 MCG: 250 INJECTION, SOLUTION INTRAMUSCULAR at 18:53

## 2025-05-08 RX ADMIN — Medication 12 ML: at 12:23

## 2025-05-08 RX ADMIN — CEFAZOLIN SODIUM 2 G: 2 INJECTION, SOLUTION INTRAVENOUS at 18:22

## 2025-05-08 RX ADMIN — AZITHROMYCIN 500 MG: 500 INJECTION, POWDER, LYOPHILIZED, FOR SOLUTION INTRAVENOUS at 18:30

## 2025-05-08 RX ADMIN — BUPIVACAINE HYDROCHLORIDE 6 ML: 2.5 INJECTION, SOLUTION EPIDURAL; INFILTRATION; INTRACAUDAL; PERINEURAL at 15:27

## 2025-05-08 RX ADMIN — Medication 80 MCG: at 19:20

## 2025-05-08 RX ADMIN — BUPIVACAINE HYDROCHLORIDE 6 ML: 2.5 INJECTION, SOLUTION EPIDURAL; INFILTRATION; INTRACAUDAL; PERINEURAL at 12:02

## 2025-05-08 RX ADMIN — BUPIVACAINE HYDROCHLORIDE 5 ML: 2.5 INJECTION, SOLUTION EPIDURAL; INFILTRATION; INTRACAUDAL; PERINEURAL at 18:14

## 2025-05-08 RX ADMIN — FAMOTIDINE 20 MG: 10 INJECTION, SOLUTION INTRAVENOUS at 18:23

## 2025-05-08 RX ADMIN — OXYTOCIN 10 UNITS: 10 INJECTION INTRAVENOUS at 19:05

## 2025-05-08 RX ADMIN — FENTANYL CITRATE 50 MCG: 0.05 INJECTION, SOLUTION INTRAMUSCULAR; INTRAVENOUS at 12:02

## 2025-05-08 RX ADMIN — BUPIVACAINE HYDROCHLORIDE 5 ML: 2.5 INJECTION, SOLUTION EPIDURAL; INFILTRATION; INTRACAUDAL; PERINEURAL at 18:17

## 2025-05-08 RX ADMIN — BUPIVACAINE HYDROCHLORIDE 5 ML: 2.5 INJECTION, SOLUTION EPIDURAL; INFILTRATION; INTRACAUDAL; PERINEURAL at 18:12

## 2025-05-08 SDOH — HEALTH STABILITY: MENTAL HEALTH: CURRENT SMOKER: 0

## 2025-05-08 ASSESSMENT — PAIN SCALES - GENERAL
PAINLEVEL_OUTOF10: 1
PAINLEVEL_OUTOF10: 0 - NO PAIN
PAINLEVEL_OUTOF10: 6
PAIN_LEVEL: 2
PAINLEVEL_OUTOF10: 4
PAINLEVEL_OUTOF10: 1
PAINLEVEL_OUTOF10: 0 - NO PAIN
PAINLEVEL_OUTOF10: 2
PAINLEVEL_OUTOF10: 1
PAINLEVEL_OUTOF10: 7

## 2025-05-08 NOTE — PROGRESS NOTES
Called to patient bedside for prolonged deceleration. Upon my entry, pitocin had been discontinued and FHR had returned to baseline with patient in far right lateral. Fundus palpates soft. SVE 4/80/-1 with IUPC in place. Small quantity blood on chux, no active bleeding. FHT reverted to Category 1. Patient's primary provider notified with plans to come evaluate patient.

## 2025-05-08 NOTE — PROGRESS NOTES
Delay in  section due to the flow of the floor and another surgical case. [facility] cat I.    Alonzo Brunner MD

## 2025-05-08 NOTE — PROGRESS NOTES
In to evaluate the patient at bedside. She is resting comfortably in bed. FHT category I with Pitocin turned off. Cervical recheck performed by myself and cervix unchanged at /-1.     Discussed clinical course up until this point with patient and her . Discussed that given the fact that Pitocin is currently off due to recent prolonged deceleration in the setting of her being ruptured for ~18 hours with minimal/no cervical change despite being on high doses of Pitocin, would recommend moving towards  delivery at this time. R/B/A of  section discussed with them thoroughly and they voiced understanding and are in agreement with this plan of care. Plan to move forward with primary low transverse  section at this time for failed induction of labor.    RN present at bedside for this discussion    Alonzo Brunner MD

## 2025-05-08 NOTE — PROGRESS NOTES
Intrapartum Progress Note    Assessment/Plan   Saleem Jo is a 29 y.o.  at 37w2d. UGO: 2025, by Ultrasound.     Patient and FOB counseled  Agree to amniotomy    Amniotomy - clear fluid, no complications  Continue pitocin per protocol    Assessment & Plan  Severe preeclampsia, third trimester ([facility])    Pregnancy Problems (from 24 to present)       Problem Noted Diagnosed Resolved    Severe preeclampsia, third trimester ([facility]) 2025 by Jered Thorne MD  No    Priority:  Medium       12 weeks gestation of pregnancy ([facility]) 2024 by Aurora Loyd DO  No    Priority:  Medium       Overview Signed 2024 12:06 PM by Aurora Loyd DO   Desired provider in labor: [] CNM  [] Physician  [] Blood Products: [x] Yes, accepts [] No, needs counseling  [x] Initial BMI: 25.44   [] Prenatal Labs:   [] Cervical Cancer Screening up to date  [] Rh status:   [] Genetic Screening:    [] NT US: (11-13 wks)  [] Baby ASA (if indicated):  [] Pregnancy dated by:     [] Anatomy US: (19-20 wks)  [] Federal Sterilization consent signed (if indicated):  [] 1hr GCT at 24-28wks:  [] Rhogam (if indicated):   [] Fetal Surveillance (if indicated):  [] Tdap (27-32 wks, may be given up to 36 wks if initial window missed):   [] RSV (32-36 wks) (Sept. to end ):   [] Flu Vaccine:    [] Breastfeeding:  [] Postpartum Birth control method:   [] GBS at 36 - 37 wks:  [] 39 weeks discussion of IOL vs. Expectant management:  [] Mode of delivery ( anticipated ):                   Subjective   comfortable    Objective   Last Vitals:  Temp Pulse Resp BP MAP Pulse Ox   36.7 °C (98.1 °F) (temperature retaken orally) 81 17 128/62 83 97 %     Vitals Min/Max Last 24 Hours:  Temp  Min: 36.2 °C (97.2 °F)  Max: 36.9 °C (98.4 °F)  Pulse  Min: 73  Max: 105  Resp  Min: 15  Max: 18  BP  Min: 114/58  Max: 148/72  MAP (mmHg)  Min: 81  Max: 108    Intake/Output:  No intake or output data in the 24 hours ending 25      Physical Examination:  FHR is 123 BPM, with Accelerations, and a cat 1  tracing.    Naranja readin-4min   CERVIX: 2+ cm dilated, 50 % effaced, -2 station; MEMBRANES are ruptured - clear fluid    Chaperone Present: Declined.  Chaperone Name/Title:   Examination Chaperoned:     Lab Review:

## 2025-05-08 NOTE — ANESTHESIA PROCEDURE NOTES
Epidural Block    Patient location during procedure: OB  Start time: 5/7/2025 10:10 PM  End time: 5/7/2025 10:45 PM  Reason for block: labor analgesia  Staffing  Performed: CRNA   Authorized by: PETEY Shannon    Performed by: PETEY Shannon    Preanesthetic Checklist  Completed: patient identified, IV checked, risks and benefits discussed, surgical consent, pre-op evaluation, timeout performed and sterile techniques followed  Block Timeout  RN/Licensed healthcare professional reads aloud to the Anesthesia provider and entire team: Patient identity, procedure with side and site, patient position, and as applicable the availability of implants/special equipment/special requirements.  Patient on coagulant treatment: no  Timeout performed at: 5/7/2025 10:06 PM  Block Placement  Patient position: sitting  Prep: Betadine  Sterility prep: cap, drape, gloves, hand and mask  Sedation level: no sedation  Patient monitoring: blood pressure, continuous pulse oximetry and heart rate  Approach: midline  Local numbing: lidocaine 1% to skin and subcutaneous tissues  Vertebral space: lumbar  Lumbar location: L3-L4  Epidural  Loss of resistance technique: saline  Guidance: landmark technique        Needle  Needle type: Tuohy   Needle gauge: 17  Needle length: 8.9cm  Needle insertion depth: 7 cm  Catheter size: 19 G  Catheter at skin depth: 14 cm  Catheter securement method: clear occlusive dressing, liquid medical adhesive, stabilization device and surgical tape    Test dose: lidocaine 1.5% with epinephrine 1-to-200,000  Test dose: lidocaine 1.5% with epinephrine 1-to-200,000  Test dose result: no positive test dose    PCEA  Medication concentration used: 0.2% Ropivacaine with 2 mcg/mL Fentanyl  Dose (mL): 5  Lockout (minutes): 20  1-Hour Limit (boluses/hr): 3  Basal Rate: 10        Assessment  Sensory level: T10  Block outcome: pain improved  Number of attempts: 2  Events: no positive test dose  Procedure  assessment: patient tolerated procedure well with no immediate complications  Additional Notes  2 attempts at epidural after hitting os.  Same level but went over to the right slightly until finally able to feel ligament.  Slight pressure feeling in left hip when threading catheter but quickly resolved.  No pain on medication injection.  Patient tolerated well.  Patient comfortable after receiving first bolus dose of medication.  Vitals stable.  Pump started.

## 2025-05-08 NOTE — PROGRESS NOTES
"  Intrapartum Progress Note   Subjective     Patient doing well without complaints.     Objective   /76   Pulse 73   Temp 36.8 °C (98.2 °F) (Oral)   Resp 16   Ht 1.689 m (5' 6.5\")   Wt 106 kg (234 lb)   BMI 37.20 kg/m²   EFM:   Baseline FHR               Accelerations - present               Deceleratons - absent              Category -  1  Cervix:  3-4/90/-1  Contractions: Q  2-4 minutes    Assessment       30 y.o.  at 37w3d , UGO  2025, by Ultrasound    Plan     #Labor  -Pitocin   -Epidural   -IUPC placed  -Will continue to monitor patient closely    Dolly Clemons MD             "

## 2025-05-08 NOTE — ANESTHESIA PREPROCEDURE EVALUATION
"Patient: Saleem Jo \"Rebecca\"    Evaluation Method: In-person visit    Procedure Information    Date: 05/06/25  Procedure: Labor Consult         Relevant Problems   Anesthesia (within normal limits)      Cardiac   (+) Severe preeclampsia, third trimester ([facility])      Pulmonary (within normal limits)      Neuro (within normal limits)      GI (within normal limits)  Mild GERD      /Renal (within normal limits)      Liver (within normal limits)      Endocrine (within normal limits)      Hematology (within normal limits)      Musculoskeletal (within normal limits)  Wrist and ankle pain      HEENT (within normal limits)      ID (within normal limits)      Skin (within normal limits)      GYN   (+) 12 weeks gestation of pregnancy ([facility])       Clinical information reviewed:   Tobacco  Allergies  Meds   Med Hx  Surg Hx   Fam Hx          NPO Detail:  No data recorded       OB/GYN     Physical Exam    Airway  Mallampati: II  TM distance: >3 FB  Neck ROM: full  Mouth opening: 3 or more finger widths     Cardiovascular - normal exam   Dental - normal exam     Pulmonary - normal exam   Abdominal - normal exam           Anesthesia Plan    History of general anesthesia?: no  History of complications of general anesthesia?: unknown/emergency    ASA 2     epidural     The patient is not a current smoker.    Anesthetic plan and risks discussed with patient.  Use of blood products discussed with patient who consented to blood products.    Plan discussed with CRNA.        " unk

## 2025-05-09 LAB
ALBUMIN SERPL BCP-MCNC: 2.9 G/DL (ref 3.4–5)
ALP SERPL-CCNC: 79 U/L (ref 33–110)
ALT SERPL W P-5'-P-CCNC: 9 U/L (ref 7–45)
ANION GAP SERPL CALCULATED.3IONS-SCNC: 12 MMOL/L (ref 10–20)
AST SERPL W P-5'-P-CCNC: 20 U/L (ref 9–39)
BILIRUB SERPL-MCNC: 0.4 MG/DL (ref 0–1.2)
BUN SERPL-MCNC: 6 MG/DL (ref 6–23)
CALCIUM SERPL-MCNC: 8.1 MG/DL (ref 8.6–10.3)
CHLORIDE SERPL-SCNC: 109 MMOL/L (ref 98–107)
CO2 SERPL-SCNC: 20 MMOL/L (ref 21–32)
CREAT SERPL-MCNC: 0.66 MG/DL (ref 0.5–1.05)
EGFRCR SERPLBLD CKD-EPI 2021: >90 ML/MIN/1.73M*2
ERYTHROCYTE [DISTWIDTH] IN BLOOD BY AUTOMATED COUNT: 13.6 % (ref 11.5–14.5)
GLUCOSE SERPL-MCNC: 94 MG/DL (ref 74–99)
HCT VFR BLD AUTO: 30.2 % (ref 36–46)
HGB BLD-MCNC: 9.5 G/DL (ref 12–16)
MCH RBC QN AUTO: 28.4 PG (ref 26–34)
MCHC RBC AUTO-ENTMCNC: 31.5 G/DL (ref 32–36)
MCV RBC AUTO: 90 FL (ref 80–100)
NRBC BLD-RTO: 0 /100 WBCS (ref 0–0)
PLATELET # BLD AUTO: 165 X10*3/UL (ref 150–450)
POTASSIUM SERPL-SCNC: 4.4 MMOL/L (ref 3.5–5.3)
PROT SERPL-MCNC: 5.3 G/DL (ref 6.4–8.2)
RBC # BLD AUTO: 3.34 X10*6/UL (ref 4–5.2)
SODIUM SERPL-SCNC: 137 MMOL/L (ref 136–145)
WBC # BLD AUTO: 17.7 X10*3/UL (ref 4.4–11.3)

## 2025-05-09 PROCEDURE — 84075 ASSAY ALKALINE PHOSPHATASE: CPT | Performed by: STUDENT IN AN ORGANIZED HEALTH CARE EDUCATION/TRAINING PROGRAM

## 2025-05-09 PROCEDURE — 85027 COMPLETE CBC AUTOMATED: CPT | Performed by: STUDENT IN AN ORGANIZED HEALTH CARE EDUCATION/TRAINING PROGRAM

## 2025-05-09 PROCEDURE — 1220000001 HC OB SEMI-PRIVATE ROOM DAILY

## 2025-05-09 PROCEDURE — 88307 TISSUE EXAM BY PATHOLOGIST: CPT | Mod: TC,TRILAB | Performed by: STUDENT IN AN ORGANIZED HEALTH CARE EDUCATION/TRAINING PROGRAM

## 2025-05-09 PROCEDURE — 36415 COLL VENOUS BLD VENIPUNCTURE: CPT | Performed by: STUDENT IN AN ORGANIZED HEALTH CARE EDUCATION/TRAINING PROGRAM

## 2025-05-09 PROCEDURE — 2500000001 HC RX 250 WO HCPCS SELF ADMINISTERED DRUGS (ALT 637 FOR MEDICARE OP): Performed by: STUDENT IN AN ORGANIZED HEALTH CARE EDUCATION/TRAINING PROGRAM

## 2025-05-09 PROCEDURE — 2500000004 HC RX 250 GENERAL PHARMACY W/ HCPCS (ALT 636 FOR OP/ED): Mod: JZ | Performed by: STUDENT IN AN ORGANIZED HEALTH CARE EDUCATION/TRAINING PROGRAM

## 2025-05-09 RX ORDER — FERROUS SULFATE 325(65) MG
65 TABLET ORAL
Status: DISCONTINUED | OUTPATIENT
Start: 2025-05-09 | End: 2025-05-11 | Stop reason: HOSPADM

## 2025-05-09 RX ADMIN — ACETAMINOPHEN 975 MG: 325 TABLET, FILM COATED ORAL at 18:46

## 2025-05-09 RX ADMIN — ACETAMINOPHEN 975 MG: 325 TABLET, FILM COATED ORAL at 12:57

## 2025-05-09 RX ADMIN — ACETAMINOPHEN 975 MG: 325 TABLET, FILM COATED ORAL at 05:04

## 2025-05-09 RX ADMIN — KETOROLAC TROMETHAMINE 30 MG: 30 INJECTION, SOLUTION INTRAMUSCULAR at 12:57

## 2025-05-09 RX ADMIN — OXYCODONE HYDROCHLORIDE 5 MG: 5 TABLET ORAL at 22:47

## 2025-05-09 RX ADMIN — KETOROLAC TROMETHAMINE 30 MG: 30 INJECTION, SOLUTION INTRAMUSCULAR at 05:03

## 2025-05-09 RX ADMIN — FERROUS SULFATE TAB 325 MG (65 MG ELEMENTAL FE) 325 MG: 325 (65 FE) TAB at 18:46

## 2025-05-09 ASSESSMENT — PAIN SCALES - GENERAL
PAINLEVEL_OUTOF10: 0 - NO PAIN
PAINLEVEL_OUTOF10: 3
PAINLEVEL_OUTOF10: 1
PAINLEVEL_OUTOF10: 5 - MODERATE PAIN
PAIN_LEVEL: 3
PAINLEVEL_OUTOF10: 1

## 2025-05-09 NOTE — CARE PLAN
The patient's goals for the shift include safe delivery    The clinical goals for the shift include rest & pain management    Problem: Vaginal Birth or  Section  Goal: No s/sx of hemorrhage through recovery  Outcome: Met     Problem: Hypertensive Disorder of Pregnancy (HDP)  Goal: Adequate urine output (0.5 ml/kg/hr)  Outcome: Progressing     Problem: Pain - Adult  Goal: Verbalizes/displays adequate comfort level or baseline comfort level  Outcome: Progressing     Problem: Safety - Adult  Goal: Free from fall injury  Outcome: Progressing

## 2025-05-09 NOTE — PROGRESS NOTES
Postpartum Progress Note    Assessment/Plan   Saleem Jo is a 30 y.o., , who delivered at 37w3d gestation and is now postpartum day 1.    #Postpartum  -S/p PLTCS on 25  -Meeting postpartum milestones appropriately  -Ambulating, tolerating PO, pain well controlled, urinating spontaneously   -Baby girl  -Contraception undecided but considering IUD   -Continue routine postpartum care   -Anticipate DC home on postpartum day 3  -Patient to follow up PP in the office in 1 week for incision check and 6 weeks PP    #Pre-eclampsia w/ SF  -Latest BP normal range  -Patient is asymptomatic  -CBC/CMP this AM wnl  -Will cont to monitor BP closely     Hospital course: pre-eclampsia with severe features   section delivery  Patient is currently breastfeedingThe patient's blood type is O POS. The baby's blood type is O POS. Rhogam is not indicated.    Subjective     When seen this morning, patient is doing well.  She reports a good mood. She is eating and drinking without issue. She is ambulating. She is urinating spontaneously and passing flatus. Her pain is well controlled. She is breast feeding. Her postpartum contraception plan is undecided.    She denies headaches, vision changes, CP, SOB, RUQ pain, calf tenderness.     Objective   Allergies:   Patient has no known allergies.         Last Vitals:  Temp Pulse Resp BP MAP Pulse Ox   36.4 °C (97.5 °F) 75 18 112/58 79 98 %     Vitals Min/Max Last 24 Hours:  Temp  Min: 36.1 °C (97 °F)  Max: 36.8 °C (98.2 °F)  Pulse  Min: 65  Max: 121  Resp  Min: 16  Max: 19  BP  Min: 103/58  Max: 144/81  MAP (mmHg)  Min: 73  Max: 107    Intake/Output:     Intake/Output Summary (Last 24 hours) at 2025 0918  Last data filed at 2025 0530  Gross per 24 hour   Intake 2143.67 ml   Output 5493 ml   Net -3349.33 ml     Physical Exam:  General: Alert and oriented, in no acute distress.  Psych: Normal affect and mood. Able to answer questions appropriately.   Heart: Regular rate  and rhythm.  Lungs: Clear to auscultation bilaterally.  Abdomen: Soft, non-tender, no rebound or guarding.  Uterus: Firm, below the umbilicus, not tender.  Incision: Clean, dry, intact with overlying steri strips in place and overlying bandage in place   Extremities: No calf tenderness bilaterally     Lab Data:  Lab Results   Component Value Date    ABO O 05/06/2025    LABRH POS 05/06/2025    ABSCRN NEG 05/06/2025     Lab Results   Component Value Date    WBC 17.7 (H) 05/09/2025    HGB 9.5 (L) 05/09/2025    HCT 30.2 (L) 05/09/2025     05/09/2025     Lab Results   Component Value Date    GLUCOSE 94 05/09/2025     05/09/2025    K 4.4 05/09/2025     (H) 05/09/2025    CO2 20 (L) 05/09/2025    ANIONGAP 12 05/09/2025    BUN 6 05/09/2025    CREATININE 0.66 05/09/2025    EGFR >90 05/09/2025    CALCIUM 8.1 (L) 05/09/2025    ALBUMIN 2.9 (L) 05/09/2025    PROT 5.3 (L) 05/09/2025    ALKPHOS 79 05/09/2025    ALT 9 05/09/2025    AST 20 05/09/2025    BILITOT 0.4 05/09/2025     0   Lab Value Date/Time    UTPCR 0.14 05/06/2025 1821

## 2025-05-09 NOTE — CARE PLAN
Problem: Hypertensive Disorder of Pregnancy (HDP)  Goal: Minimal s/sx of HDP and BP<160/110  Outcome: Progressing  Goal: Adequate urine output (0.5 ml/kg/hr)  Outcome: Progressing     Problem: Nausea/Vomiting  Goal: Adequate urine output (0.5 ml/kg/hr)  Outcome: Progressing  Goal: Free from nausea/vomiting  Outcome: Progressing  Goal: Tolerates prescribed diet  Outcome: Progressing  Goal: Weight maintenance or gain  Outcome: Progressing  Goal: Achieve/maintain normal electrolyte level  Outcome: Progressing     Problem: Pain - Adult  Goal: Verbalizes/displays adequate comfort level or baseline comfort level  Outcome: Progressing     Problem: Safety - Adult  Goal: Free from fall injury  Outcome: Progressing     Problem: Discharge Planning  Goal: Discharge to home or other facility with appropriate resources  Outcome: Progressing

## 2025-05-09 NOTE — ANESTHESIA POSTPROCEDURE EVALUATION
"Patient: Saleem Jo \"Rebecca\"    Procedure Summary       Date: 25 Room / Location: [facility]  / [facility]    Anesthesia Start:  Anesthesia Stop: 25    Procedure:  DELIVERY (Abdomen) Diagnosis:       (Failure to Progress)      (Fetal Intolerance of Labor)    Surgeons: Alonzo Brunner MD Responsible Provider: PETEY Torres    Anesthesia Type: epidural ASA Status: 2            Anesthesia Type: epidural    Vitals Value Taken Time   /77 25 21:41   Temp 36.1 °C (97 °F) 25 19:55   Pulse 69 25 21:41   Resp 19 25 21:41   SpO2 100 % 25 21:41       Anesthesia Post Evaluation    Patient location during evaluation: bedside  Patient participation: complete - patient participated  Level of consciousness: awake  Pain score: 2  Pain management: adequate  Airway patency: patent  Cardiovascular status: acceptable  Respiratory status: acceptable  Hydration status: acceptable  Postoperative Nausea and Vomiting: mild  Comments: Pt able to lift legs off bed, denies any numbness or tingling. Ambulates without assistance.  Denies back pain.        No notable events documented.    "

## 2025-05-09 NOTE — ANESTHESIA POSTPROCEDURE EVALUATION
"Patient: Saleem Jo \"Rebecca\"    Procedure Summary       Date: 25 Room / [facility]: [facility]    Anesthesia Start:  Anesthesia Stop: 25    Procedure:  DELIVERY (Abdomen) Diagnosis:       (Failure to Progress)      (Fetal Intolerance of Labor)    Surgeons: Alonzo Brunner MD Responsible Provider: PETEY Torres    Anesthesia Type: epidural ASA Status: 2            Anesthesia Type: epidural    Vitals Value Taken Time   /77 25 21:41   Temp 36.1 °C (97 °F) 25 19:55   Pulse 69 25 21:41   Resp 19 25 21:41   SpO2 100 % 25 21:41       Anesthesia Post Evaluation    Patient location during evaluation: bedside  Patient participation: complete - patient participated  Level of consciousness: awake and alert  Pain score: 3  Pain management: adequate  Airway patency: patent  Cardiovascular status: acceptable  Respiratory status: acceptable  Hydration status: acceptable  Postoperative Nausea and Vomiting: none  Comments: Neuraxial site assessed. No visible redness or swelling or drainage. Patient able to ambulate and move all extremities without difficulty. Able to void. No complaints of nausea/vomiting. Tolerating PO intake well. No s/sx of PDPH.         No notable events documented.    "

## 2025-05-09 NOTE — LACTATION NOTE
This note was copied from a baby's chart.  Met with mother in [facility], mother has been pumping for infant every 3 hours with yield of 5 ml at last pump. Expressed breast milk is brown in color. Mother reports some bleeding at the base of her right  nipple when pumping. I advised her to use the 24mm flange instead of the 21mm. Infant to mother to latch, positioned in football hold, infant too sleepy to latch at this time, mother placed infant  in skin to skin.  I reviewed frequency of pumping and benefit of skin to skin. Offered support as needed.

## 2025-05-09 NOTE — L&D DELIVERY NOTE
Birth Operative Report    Patient Name: Saleem Jo  : 1995  MRN: [id number]  Age: 30 y.o.    /Para:   Gestational Age: 37w3d    Date of Surgery: 2025    Operating Room Location: [place] [address]    Pre-op Diagnosis:  Intrauterine Pregnancy at 37w3d  Failed induction of labor  Failure to progress in the first stage of labor    Post-op Diagnosis:  Same    Procedure:   Repeat Low Transverse  Section and Primary Low Transverse  Section    Surgery Category at [facility] Time: Confirmed Non-scheduled, Non-urgent    Surgeon:    * Alonzo Brunner - Primary    Resident/Fellow/Other Assistant:   Surgeons and Role:     * Letty Magdaleno MD - Assisting    Anesthesia:  Type: epidural     CRNA: NILESH Shannon-CRNA; NILESH Torres-CRNA  Anesthesiologist (Solo in Case): Angie Sr MD MPH    Surgical Staff:  Circulator: Nela Knapp RN  Scrub Person: Elena Land RN; Benjamin Love SA     Preoperative Antibiotics: Ancef 2 g and Azithromycin 500 mg    Indication for Procedure:   30 y.o.  at 37w3d, failed induction of labor for pre-eclampsia with severe features, she did not make cervical change past 4 cm.    Informed Consent:  The risks, benefits, complications, and alternatives were discussed with the patient. The patient understood that the risks of  section include but are not limited to infection, bleeding, injury to nearby structures or organs, possible need for transfusion, and potential need for more surgery. The patient stated understanding and desired to proceed. All questions were answered. The site of surgery was properly noted and marked. The patient's identity was confirmed, and the procedure verified as a  delivery. A Time Out was held and the above information confirmed.     Findings: Normal appearing gravid uterus, 1-2cm anterior peduculated fibroid noted near the uterine fundus.  Fallopian tubes and ovaries normal.   Amniotic fluid Clear, Female infant in Vertex Occiput Transverse presentation, APGARS 5 , 3 .  Birth Weight 3.26 kg.    Description of Procedure:   The patient was taken to the operating room where anesthesia was found to be adequate. Antibiotics were given for infection prophylaxis. Aparicio catheter was inserted in the usual sterile fashion. She was prepped and draped in the normal sterile fashion in the dorsal supine position with leftward tilt. A Pfannenstiel skin incision was made with scalpel. The incision was carried down to the fascia using the Bovie. The fascia was then nicked bilaterally using the Bovie then incised and extended laterally sharply. The superior aspect of the fascia was grasped with Kocher clamps. The underlying rectus muscles were dissected off. In a similar fashion, the inferior aspect of the fascia was elevated with Kocher clamps, and the rectus muscle was dissected off. The peritoneum was identified and entered using a combination of blunt and sharp dissection. Peritoneal incision was extended superiorly and inferiorly with good visualization of the bladder.    The bladder blade was inserted, vesicouterine peritoneum was identified, and a bladder flap was created. The lower uterine segment was then incised with a low transverse incision and was extended laterally with cephalocaudal traction. The infant was noted to be in the cephalic position. The infant was delivered atraumatically. The cord was then clamped and cut, and the baby was handed off.     The placenta was then removed without difficulty. The uterus was then exteriorized and cleared of all clots and debris. The uterine incision was repaired with 0 Vicryl suture in a running locked continuous fashion. A second imbricating layer of the same suture was placed and good hemostasis observed. Figure of eight stitches were placed adherent to the hysterotomy to aid in hemostasis. The uterus was noted to be atonic and so TXA, Hemabate, and  intra-uterine Pitocin were administered to aid in obtaining good uterine tone. Excellent tone was obtained and hemostasis was maintained.    The ovaries and fallopian tubes appeared normal bilaterally. The uterus,  fallopian tubes, and ovaries were then returned to the abdomen and pelvis. The uterine incision was reinspected and found to be hemostatic. The subfascial spaces were inspected and noted to be hemostatic. The fascia was then reapproximated with 0 Vicryl suture in a running continuous fashion. The subcutaneous area was re-approximated in two layers with interrupted 3-0 Vicryl. The skin was closed with 4-0 Vicryl.    The patient tolerated the procedure well. Sponge, instrument, and needle counts were correct and the patient was taken to the recovery room in good and stable condition.    There was no resident available for assistance.  Dr. Jackelyn Magdaleno helped with retraction, visualization, delivery of infant, and hemostasis.    Complications:  None; patient tolerated the procedure well.     Disposition: PACU - hemodynamically stable.  Condition: stable       * Alonzo Brunner - Primary was present for key portions of the procedure.    Complications:           Anesthesia Record               Intraprocedure I/O Totals          Intake    Tranexamic Acid 0.00 mL    The total shown is the total volume documented since Anesthesia Start was filed.    Oxytocin Drip 422.57 mL    The total shown is the total volume documented since Anesthesia Start was filed.    lactated Ringer's infusion 1000.00 mL    fentaNYL-ROPivacaine-NaCl (PF) 2-0.2 mcg/mL-% epidural infusion 6.00 mL    Total Intake 1428.57 mL       Output    Urine 1200 mL    Total Blood Loss - Surgical Delivery (mL) 860 mL    Total Output 2060 mL       Net    Net Volume -631.43 mL       Other (could not be determined as input or output)    Surgical Delivery Estimated Blood Loss (mL) 860          Blood Product Administration History       None           Uterotonics/Hemostatic Agent: IV Pitocin 30 units, IM Pitocin 10 units, IV TXA 1000 mg, and IM Carboprost 0.25 mg    Specimen:   Placenta  Delivered: 2025  6:48 PM  Appearance: Intact  Removal: Spontaneous    Disposition: pathology    Sponge/Instrument/Needle Counts: The sponge, lap and needle counts were correct.    Patient Disposition: Patient recovering on labor and delivery in stable condition.    Additional Procedures:  None    Aury Jo [[id number]]      Labor Events    Rupture date/time: 2025  Rupture type: Artificial  Fluid color: Clear  Fluid odor: None  Labor type: Induced Onset of Labor  Labor allowed to proceed with plans for an attempted vaginal birth?: Yes  Induction: Misoprostol, Oxytocin  Induction indications: Hypertensive Disorder of Pregnancy  Complications: Failure to Progress in First Stage       Placenta    Placenta delivery date/time: 2025 18:48  Placenta removal: Spontaneous  Placenta appearance: Intact  Placenta disposition: pathology       Cord    Vessels: 3 vessels  Complications: None  Delayed cord clamping?: No  Cord blood disposition: Lab  Gases sent?: No  Stem cell collection (by provider): No       Lacerations    Episiotomy: None  Perineal laceration: None  Other lacerations?: No  Repair suture: None       Anesthesia    Method: Epidural       Operative Delivery    Forceps attempted?: No  Vacuum extractor attempted?: No       Shoulder Dystocia    Shoulder dystocia present?: No       [place] Delivery    Birth date/time: 2025 18:47:00  Delivery type: , Low Transverse   categorization: primary   priority: non-scheduled, non-urgent  Indications for : Unsuccessful Induction of Labor  Complications: Failure to Progress in First Stage       Resuscitation    Method: Suctioning, Tactile stimulation, Continuous positive airway pressure (CPAP), Supplemental oxygen, Positive pressure ventilation (PPV)       Apgars    Living  status: Living  Apgar Component Scores:  1 min.:  5 min.:  10 min.:  15 min.:  20 min.:    Skin color:  0  0  1  1     Heart rate:  2  2  2  2     Reflex irritability:  1  0  2  2     Muscle tone:  1  0  2  2     Respiratory effort:  1  1  1  2     Total:  5  3  8  9     Apgars assigned by: AVELINA PITTSP       Delivery Providers    Delivering clinician: Alonzo Brunner MD   Provider Role    Nela Knapp RN Delivery Nurse    Clarissa Fuentes RN Nursery Nurse     Resident

## 2025-05-10 ENCOUNTER — PHARMACY VISIT (OUTPATIENT)
Dept: PHARMACY | Facility: CLINIC | Age: 30
End: 2025-05-10

## 2025-05-10 PROCEDURE — RXMED WILLOW AMBULATORY MEDICATION CHARGE

## 2025-05-10 PROCEDURE — 1220000001 HC OB SEMI-PRIVATE ROOM DAILY

## 2025-05-10 PROCEDURE — 2500000001 HC RX 250 WO HCPCS SELF ADMINISTERED DRUGS (ALT 637 FOR MEDICARE OP): Performed by: STUDENT IN AN ORGANIZED HEALTH CARE EDUCATION/TRAINING PROGRAM

## 2025-05-10 RX ORDER — IBUPROFEN 600 MG/1
600 TABLET ORAL EVERY 6 HOURS
Qty: 30 TABLET | Refills: 0 | Status: SHIPPED | OUTPATIENT
Start: 2025-05-10

## 2025-05-10 RX ORDER — ONDANSETRON 4 MG/1
4 TABLET, FILM COATED ORAL EVERY 6 HOURS PRN
Qty: 20 TABLET | Refills: 0 | Status: SHIPPED | OUTPATIENT
Start: 2025-05-10

## 2025-05-10 RX ORDER — OXYCODONE HYDROCHLORIDE 5 MG/1
5 TABLET ORAL EVERY 4 HOURS PRN
Qty: 20 TABLET | Refills: 0 | Status: SHIPPED | OUTPATIENT
Start: 2025-05-10

## 2025-05-10 RX ORDER — DOCUSATE SODIUM 100 MG/1
100 CAPSULE, LIQUID FILLED ORAL 2 TIMES DAILY
Qty: 30 CAPSULE | Refills: 0 | Status: SHIPPED | OUTPATIENT
Start: 2025-05-10 | End: 2025-05-25

## 2025-05-10 RX ADMIN — IBUPROFEN 600 MG: 600 TABLET ORAL at 08:11

## 2025-05-10 RX ADMIN — ACETAMINOPHEN 975 MG: 325 TABLET, FILM COATED ORAL at 21:01

## 2025-05-10 RX ADMIN — ACETAMINOPHEN 975 MG: 325 TABLET, FILM COATED ORAL at 02:32

## 2025-05-10 RX ADMIN — OXYCODONE HYDROCHLORIDE 10 MG: 5 TABLET ORAL at 03:22

## 2025-05-10 RX ADMIN — IBUPROFEN 600 MG: 600 TABLET ORAL at 15:05

## 2025-05-10 RX ADMIN — FERROUS SULFATE TAB 325 MG (65 MG ELEMENTAL FE) 325 MG: 325 (65 FE) TAB at 08:11

## 2025-05-10 RX ADMIN — ACETAMINOPHEN 975 MG: 325 TABLET, FILM COATED ORAL at 15:05

## 2025-05-10 RX ADMIN — OXYCODONE HYDROCHLORIDE 5 MG: 5 TABLET ORAL at 21:02

## 2025-05-10 RX ADMIN — IBUPROFEN 600 MG: 600 TABLET ORAL at 02:32

## 2025-05-10 RX ADMIN — IBUPROFEN 600 MG: 600 TABLET ORAL at 21:02

## 2025-05-10 RX ADMIN — ACETAMINOPHEN 975 MG: 325 TABLET, FILM COATED ORAL at 08:11

## 2025-05-10 ASSESSMENT — PAIN SCALES - GENERAL
PAINLEVEL_OUTOF10: 5 - MODERATE PAIN
PAINLEVEL_OUTOF10: 3
PAINLEVEL_OUTOF10: 4
PAINLEVEL_OUTOF10: 0 - NO PAIN

## 2025-05-10 NOTE — LACTATION NOTE
Lactation Consultant Note  Lactation Consultation  Reason for Consult: Initial assessment  Consultant Name: Leanne Huang RN    Maternal Information  Has mother  before?: No  Infant to breast within first 2 hours of birth?: No  Breastfeeding Delayed Due to: Infant status  Exclusive Pump and Bottle Feed: No    Maternal Assessment  Breast Assessment: Warm, Compressible  Nipple Assessment: Intact, Erect with stimulation  Areola Assessment: Normal    Infant Assessment  Infant Behavior: Quiet alert, Readiness to feed    Feeding Assessment  Nutrition Source: Breastmilk, Formula (medically indicated)  Feeding Method: Nursing at the breast, Paced bottle  Feeding Position: Baby led, Cradle, Infant not tucked close and facing mother  Suck/Feeding: Sustained, Baby led rhythmically, Coordinated suck/swallow/breathe, Audible swallowing  Latch Assessment: Deep latch obtained, Eagerly grasped on to latch, Flanged lips, Areolar attachment, Mouth open/moves head side to side, Sucks with long jaw movement, Comfortable latch    LATCH TOOL  Latch: Grasps breast, tongue down, lips flanged, rhythmic sucking  Audible Swallowing: Spontaneous and intermittent (24 hours old)  Type of Nipple: Everted (After stimulation)  Comfort (Breast/Nipple): Soft/non-tender  Hold (Positioning): No assist from staff, mother able to position/hold infant  LATCH Score: 10    Breast Pump       Other OB Lactation Tools       Patient Follow-up  Inpatient Lactation Follow-up Needed : Yes  Outpatient Lactation Follow-up: Recommended    Other OB Lactation Documentation  Maternal Risk Factors: Preeclampsia,  delivery  Infant Risk Factors: Early term birth 37-39 weeks    Recommendations/Summary  Mother called out to assess latch. Infant was latched when I walked into room. Encouraged mother to bring infant closer belly to belly and position the hands so they are hugging the breast. Infant  latched good with a sustained nutritive suck and mother denies  any pain. Peds wants supplementation after each breastfeed. Parents chose to do paced bottle. Infant took to the paced bottle well. Continuing support offered as needed.

## 2025-05-10 NOTE — LACTATION NOTE
Lactation Consultant Note      Met with mother. Mother stated breastfeeding is going well and denies any pain. Mother stated that peds would like them to continue supplementing. Mother stated they tried SNS along the breast but the infant did not like that so they did fingerfeeding. Asked about other ways to supplement. Discussed paced bottle. Parents stated they would like to try formula before they leave since they will have to supplement with it when at home. Discussed formula risks. They will call for next feed so that I can assess. Reviewed skin to skin, feeding cues, proper latch, how often to feed, and how long to feed. Continuing support offered as needed.

## 2025-05-10 NOTE — CARE PLAN
The patient's goals for the shift include  to room    The clinical goals for the shift include adequate pain management      Problem: Hypertensive Disorder of Pregnancy (HDP)  Goal: Minimal s/sx of HDP and BP<160/110  Outcome: Progressing     Patient's vital signs have remained within normal limits throughout the entirety of the shift.

## 2025-05-10 NOTE — CARE PLAN
Problem: Hypertensive Disorder of Pregnancy (HDP)  Goal: Minimal s/sx of HDP and BP<160/110  Outcome: Progressing  Goal: Adequate urine output (0.5 ml/kg/hr)  Outcome: Progressing     Problem: Nausea/Vomiting  Goal: Adequate urine output (0.5 ml/kg/hr)  Outcome: Progressing  Goal: Free from nausea/vomiting  Outcome: Progressing  Goal: Tolerates prescribed diet  Outcome: Progressing  Goal: Weight maintenance or gain  Outcome: Progressing  Goal: Achieve/maintain normal electrolyte level  Outcome: Progressing     Problem: Pain - Adult  Goal: Verbalizes/displays adequate comfort level or baseline comfort level  Outcome: Progressing     Problem: Safety - Adult  Goal: Free from fall injury  Outcome: Progressing     Problem: Discharge Planning  Goal: Discharge to home or other facility with appropriate resources  Outcome: Progressing   The patient's goals for the shift include  to room    The clinical goals for the shift include adequate pain management

## 2025-05-10 NOTE — PROGRESS NOTES
Postpartum Progress Note    Assessment/Plan   Saleem Jo is a 30 y.o., , who delivered at 39w6d gestation and is now postpartum day 2.  -routine care  -continue breastfeeding  Assessment & Plan  Severe preeclampsia, third trimester ([facility])      Pregnancy Problems (from 23 to present)       Problem Noted Resolved    Encounter for elective induction of labor 10/5/2023 by Yolanda Castro MD No    Priority:  Medium            Hospital course: no complications  LTCS  Patient is currently breastfeedingThe patient's blood type is B POS.     Subjective   Her pain is well controlled with current medications  She is passing flatus  She is ambulating well  She is tolerating a Adult diet Regular  She reports no breast or nursing problems  She denies emotional concerns today   Her plan for contraception is none      Objective   Allergies:   Patient has no known allergies.         Last Vitals:  Temp Pulse Resp BP MAP Pulse Ox   36.6 °C (97.9 °F) 81 18 126/77   99 %     Vitals Min/Max Last 24 Hours:  Temp  Min: 36.6 °C (97.9 °F)  Max: 36.9 °C (98.4 °F)  Pulse  Min: 78  Max: 99  Resp  Min: 16  Max: 18  BP  Min: 124/71  Max: 137/71    Intake/Output:     Intake/Output Summary (Last 24 hours) at 5/10/2025 1016  Last data filed at 2025 1100  Gross per 24 hour   Intake --   Output 800 ml   Net -800 ml       Physical Exam:  Appears well, NAD  Lungs  CTA   CV   RRR   Incision   dry and intact   Fundus- firm  Perineum -intact  Lochia -light  Extremities -NT    Lab Data:  Labs in chart were reviewed.

## 2025-05-11 VITALS
HEIGHT: 67 IN | OXYGEN SATURATION: 99 % | RESPIRATION RATE: 16 BRPM | BODY MASS INDEX: 36.73 KG/M2 | WEIGHT: 234 LBS | DIASTOLIC BLOOD PRESSURE: 79 MMHG | HEART RATE: 92 BPM | TEMPERATURE: 98.2 F | SYSTOLIC BLOOD PRESSURE: 134 MMHG

## 2025-05-11 PROCEDURE — 2500000001 HC RX 250 WO HCPCS SELF ADMINISTERED DRUGS (ALT 637 FOR MEDICARE OP): Performed by: STUDENT IN AN ORGANIZED HEALTH CARE EDUCATION/TRAINING PROGRAM

## 2025-05-11 PROCEDURE — 99238 HOSP IP/OBS DSCHRG MGMT 30/<: CPT | Performed by: STUDENT IN AN ORGANIZED HEALTH CARE EDUCATION/TRAINING PROGRAM

## 2025-05-11 RX ORDER — ACETAMINOPHEN 325 MG/1
975 TABLET ORAL EVERY 6 HOURS PRN
Qty: 30 TABLET | Refills: 1 | Status: SHIPPED | OUTPATIENT
Start: 2025-05-11

## 2025-05-11 RX ORDER — FERROUS SULFATE 325(65) MG
65 TABLET ORAL
Qty: 30 TABLET | Refills: 0 | Status: SHIPPED | OUTPATIENT
Start: 2025-05-11

## 2025-05-11 RX ADMIN — FERROUS SULFATE TAB 325 MG (65 MG ELEMENTAL FE) 325 MG: 325 (65 FE) TAB at 09:48

## 2025-05-11 RX ADMIN — IBUPROFEN 600 MG: 600 TABLET ORAL at 03:02

## 2025-05-11 RX ADMIN — IBUPROFEN 600 MG: 600 TABLET ORAL at 09:48

## 2025-05-11 RX ADMIN — ACETAMINOPHEN 975 MG: 325 TABLET, FILM COATED ORAL at 03:01

## 2025-05-11 RX ADMIN — OXYCODONE HYDROCHLORIDE 10 MG: 5 TABLET ORAL at 01:01

## 2025-05-11 RX ADMIN — ACETAMINOPHEN 975 MG: 325 TABLET, FILM COATED ORAL at 09:48

## 2025-05-11 ASSESSMENT — PAIN SCALES - GENERAL: PAINLEVEL_OUTOF10: 0 - NO PAIN

## 2025-05-11 NOTE — CARE PLAN
The patient's goals for the shift include Pain control    The clinical goals for the shift include Pain management      Problem: Hypertensive Disorder of Pregnancy (HDP)  Goal: Minimal s/sx of HDP and BP<160/110  Outcome: Progressing  Goal: Adequate urine output (0.5 ml/kg/hr)  Outcome: Progressing     Problem: Nausea/Vomiting  Goal: Adequate urine output (0.5 ml/kg/hr)  Outcome: Progressing  Goal: Free from nausea/vomiting  Outcome: Progressing  Goal: Tolerates prescribed diet  Outcome: Progressing  Goal: Weight maintenance or gain  Outcome: Progressing  Goal: Achieve/maintain normal electrolyte level  Outcome: Progressing     Problem: Pain - Adult  Goal: Verbalizes/displays adequate comfort level or baseline comfort level  Outcome: Progressing     Problem: Safety - Adult  Goal: Free from fall injury  Outcome: Progressing     Problem: Discharge Planning  Goal: Discharge to home or other facility with appropriate resources  Outcome: Progressing

## 2025-05-11 NOTE — DISCHARGE SUMMARY
Discharge Summary    Admission Date: 2025  Discharge Date: 25    Discharge Diagnosis  Severe preeclampsia, third trimester ([facility])    Hospital Course  Delivery Date: 2025 6:47 PM  Delivery type: , Low Transverse   GA at delivery: 37w3d  Outcome: Living  Anesthesia during delivery: Epidural  Intrapartum complications: Failure to Progress in First Stage  Feeding method: Breastfeeding Status: Yes   Procedures: none  Contraception at discharge: none    Saleem Jo is a 30 y.o. now  who initially presented to [place] at 37w1d with severe range blood pressures. She was then admitted for pre-eclampsia with severe features. Her intrapartum course was notable for a failed induction of labor.    She underwent a primary low transverse  delivery on 25 with an EBL of 893 mL. She gave birth to a baby girl with APGARS 5,3,8.  Her postpartum course was uncomplicated and her blood pressures improved after delivery. She was meeting all post partum milestones appropriately and was stable for discharge home on postpartum day 3. She will follow up in the office for a blood pressure check and incision check her first week post partum.    Last Vitals:  Temp Pulse Resp BP MAP Pulse Ox   36.7 °C (98.1 °F) 74 16 112/61 79 99 %     Discharge Meds     Your medication list        START taking these medications        Instructions Last Dose Given Next Dose Due   acetaminophen 325 mg tablet  Commonly known as: Tylenol      Take 3 tablets (975 mg) by mouth every 6 hours if needed for mild pain (1 - 3).       docusate sodium 100 mg capsule  Commonly known as: Colace      Take 1 capsule (100 mg) by mouth 2 times a day for 15 days.       ferrous sulfate 325 mg (65 mg elemental) tablet      Take 1 tablet (325 mg) by mouth once daily with breakfast.       ibuprofen 600 mg tablet      Take 1 tablet (600 mg) by mouth every 6 hours.       ondansetron 4 mg tablet  Commonly  known as: Zofran      Take 1 tablet (4 mg) by mouth every 6 hours if needed for nausea.       oxyCODONE 5 mg immediate release tablet  Commonly known as: Roxicodone      Take 1 tablet (5 mg) by mouth every 4 hours if needed (Pain score 6-8).              STOP taking these medications      nitrofurantoin (macrocrystal-monohydrate) 100 mg capsule  Commonly known as: Macrobid        PRENATAL 19 ORAL                  Where to Get Your Medications        These medications were sent to [facility]  [address], [address] [facility] [address]      Hours: 9 AM to 6 PM Mon-Fri, 9 AM to 1 PM Sat Phone: [phone]   acetaminophen 325 mg tablet  docusate sodium 100 mg capsule  ferrous sulfate 325 mg (65 mg elemental) tablet  ibuprofen 600 mg tablet  ondansetron 4 mg tablet  oxyCODONE 5 mg immediate release tablet        Complications Requiring Follow-Up  Postpartum care, incision check, blood pressure check    Test Results Pending At Discharge  Pending Labs       Order Current Status    Surgical Pathology Exam - PLACENTA In process          Outpatient Follow-Up  No future appointments.    I spent 30 minutes in the professional and overall care of this patient.      Alonzo Brunner MD

## 2025-05-11 NOTE — PROGRESS NOTES
Postpartum Progress Note    Assessment/Plan   Saleem Jo is a 30 y.o., , who delivered at 37w3d gestation and is now postpartum day 3.    #Postpartum  -S/p PLTCS on 25  -Meeting postpartum milestones appropriately  -Ambulating, tolerating PO, pain well controlled, urinating spontaneously   -Baby girl  -Contraception undecided but considering IUD   -Continue routine postpartum care   -Anticipate DC home today  -Patient to follow up PP in the office in 1 week for incision check and 6 weeks PP     #Pre-eclampsia w/ SF  -Latest BP normal range  -Patient is asymptomatic  -CBC/CMP wnl  -Will cont to monitor BP closely   -Plan for BP check in the office this week    Hospital course: no complications   section delivery  Patient is currently breastfeedingThe patient's blood type is O POS. The baby's blood type is O POS. Rhogam is not indicated.    Subjective     When seen this morning, patient is doing well.  She reports a good mood. She is eating and drinking without issue. She is ambulating. She is urinating spontaneously and passing flatus. She had had a bowel movement. Her pain is well controlled. She is breast feeding. Her postpartum contraception plan is undecided but considering IUD.    She denies headaches, vision changes, CP, SOB, RUQ pain, calf tenderness.     Objective   Allergies:   Patient has no known allergies.         Last Vitals:  Temp Pulse Resp BP MAP Pulse Ox   36.7 °C (98.1 °F) 74 16 112/61 79 99 %     Vitals Min/Max Last 24 Hours:  Temp  Min: 36.5 °C (97.7 °F)  Max: 36.7 °C (98.1 °F)  Pulse  Min: 74  Max: 108  Resp  Min: 16  Max: 16  BP  Min: 112/61  Max: 152/89  MAP (mmHg)  Min: 79  Max: 115    Intake/Output:   No intake or output data in the 24 hours ending 25 0843    Physical Exam:  General: Alert and oriented, in no acute distress.  Psych: Normal affect and mood. Able to answer questions appropriately.   Heart: Regular rate and rhythm.  Lungs: Clear to auscultation  "bilaterally.  Abdomen: Soft, non-tender, no rebound or guarding.  Uterus: Firm, below the umbilicus, not tender.  Incision: Clean, dry, intact with overlying steri strips in place  Extremities: No calf tenderness bilaterally     Lab Data:  No results found for: \"ABO\", \"LABRH\", \"ABSCRN\"  Lab Results   Component Value Date    WBC 17.7 (H) 05/09/2025    HGB 9.5 (L) 05/09/2025    HCT 30.2 (L) 05/09/2025     05/09/2025     0   Lab Value Date/Time    GRPBSTREP SEE NOTE 04/30/2025 0929     Lab Results   Component Value Date    GLUCOSE 94 05/09/2025     05/09/2025    K 4.4 05/09/2025     (H) 05/09/2025    CO2 20 (L) 05/09/2025    ANIONGAP 12 05/09/2025    BUN 6 05/09/2025    CREATININE 0.66 05/09/2025    EGFR >90 05/09/2025    CALCIUM 8.1 (L) 05/09/2025    ALBUMIN 2.9 (L) 05/09/2025    PROT 5.3 (L) 05/09/2025    ALKPHOS 79 05/09/2025    ALT 9 05/09/2025    AST 20 05/09/2025    BILITOT 0.4 05/09/2025     0   Lab Value Date/Time    UTPCR 0.14 05/06/2025 1821     "

## 2025-05-11 NOTE — LACTATION NOTE
Lactation Consultant Note  Lactation Consultation  Reason for Consult: Follow-up assessment  Consultant Name: TARIQ Barnett RN IBCLC    Maternal Information  Has mother  before?: No  Breastfeeding Delayed Due to: Infant status    Maternal Assessment       Infant Assessment       Feeding Assessment  Nutrition Source: Breastmilk, Formula (medically indicated)    LATCH TOOL       Breast Pump       Other OB Lactation Tools       Patient Follow-up  Outpatient Lactation Follow-up: Recommended    Other OB Lactation Documentation  Maternal Risk Factors:  delivery, Preeclampsia  Infant Risk Factors: Early term birth 37-39 weeks    Recommendations/Summary  Mom getting ready to be discharged upon my arrival. Current plan is that mom is first breastfeeding infant, followed by supplementing with formula via paced bottle. I encouraged mom to follow up with outpatient lactation and reviewed resources.

## 2025-05-11 NOTE — CARE PLAN
The patient's goals for the shift include discharge home with baby    The clinical goals for the shift include safe discharge home with baby.

## 2025-05-12 ENCOUNTER — TELEPHONE (OUTPATIENT)
Dept: OBSTETRICS AND GYNECOLOGY | Facility: CLINIC | Age: 30
End: 2025-05-12
Payer: OTHER GOVERNMENT

## 2025-05-12 NOTE — TELEPHONE ENCOUNTER
Called patient and left detailed message about contacting the office for BP/incision check for this Thursday. Advised patient to call back as soon as possible so we can get scheduled.

## 2025-05-14 LAB
LABORATORY COMMENT REPORT: NORMAL
PATH REPORT.FINAL DX SPEC: NORMAL
PATH REPORT.GROSS SPEC: NORMAL
PATH REPORT.RELEVANT HX SPEC: NORMAL
PATH REPORT.TOTAL CANCER: NORMAL

## 2025-05-14 NOTE — PROGRESS NOTES
"POSTPARTUM OFFICE VISIT   Subjective     Saleem Jo \"Rebecca\" is a 30 y.o.  who presents for a postpartum visit.  She is status post a PLTCS on 25.    She is doing well since delivery. She reports a good mood. She is eating and drinking without issue. She is ambulating. She is urinating spontaneously, passing flatus, and having bowel movements. Her pain is well controlled. She is breast feeding. Her postpartum contraception plan is Madisyn IUD to be placed 6 weeks PP.    She denies headaches, vision changes, CP, SOB, RUQ pain, calf tenderness.     Type of delivery:    PLTCS   Lacerations: N/A  Complications: None  Place of delivery: [facility]  Postpartum course has been: Normal.     Baby's course has been: Normal     Baby is feeding by:    Breast  Baby's name:    Maulik  Baby's weight:   7lb 4oz  Bleeding: No bleeding.  Patient is not sexually active.   Contraception method is: Madisyn to be placed at 6 wk PP.   Postpartum depression screening: Negative.    I have fully reviewed the prenatal and intrapartum course.     Objective    /80   Wt 105 kg (230 lb 6.4 oz)   LMP 2024 (Approximate)   Breastfeeding Yes   BMI 36.63 kg/m²     Physical Exam:  General: Alert and oriented, in no acute distress.  Psych: Normal affect and mood. Able to answer questions appropriately.   Lungs: Non labored respirations.  Abdomen: Soft, non-tender, no rebound or guarding.  Uterus: Firm, below the umbilicus, not tender.  Incision: Clean, dry, intact   Extremities: No calf tenderness bilaterally     Assessment/Plan      30 y.o. , s/p PLTCS on 25  Assessment & Plan  Routine postpartum follow-up  -Meeting postpartum milestones appropriately  -Continue routine postpartum care  -Follow up for 6 week PP visit, or sooner as clinically indicated        Pre-eclampsia affecting childbirth ([facility]HS-HCC)  -IOL PreE w/ SF requiring admission and IOL  -BP today normal range, she is asymptomatic  -Discussed measuring BP at " home 2x per day and notify with BP >140/90s  Orders:    miscellaneous medical supply (Blood Pressure Cuff) misc; 1 Device 2 times a day.    General counseling and advice on contraceptive management  -Provided contraception counseling at today's visit   -Medical history reviewed and she does not have any contraindications  -We discussed LARC methods as being the most effective methods of contraception with typical use, whereas the hormonal methods such as the pill, patch, vaginal ring, or depo injection are less effective with typical use.    -Education given regarding all options for contraception  -Patient would like to proceed with Madisyn IUD, paperwork completed today  -Provided with informational pamphlet regarding Madisyn  -Patient voiced understanding and all questions were answered   -Patient to follow up for placement at 6 week PP visit       Post-operative [facility]           Alonzo Brunner MD

## 2025-05-15 ENCOUNTER — POSTPARTUM VISIT (OUTPATIENT)
Dept: OBSTETRICS AND GYNECOLOGY | Facility: CLINIC | Age: 30
End: 2025-05-15
Payer: OTHER GOVERNMENT

## 2025-05-15 ENCOUNTER — TELEPHONE (OUTPATIENT)
Dept: OBSTETRICS AND GYNECOLOGY | Facility: CLINIC | Age: 30
End: 2025-05-15

## 2025-05-15 VITALS — WEIGHT: 230.4 LBS | DIASTOLIC BLOOD PRESSURE: 80 MMHG | BODY MASS INDEX: 36.63 KG/M2 | SYSTOLIC BLOOD PRESSURE: 138 MMHG

## 2025-05-15 DIAGNOSIS — Z98.890 POST-OPERATIVE STATE: ICD-10-CM

## 2025-05-15 DIAGNOSIS — Z30.09 GENERAL COUNSELING AND ADVICE ON CONTRACEPTIVE MANAGEMENT: ICD-10-CM

## 2025-05-15 PROCEDURE — 99211 OFF/OP EST MAY X REQ PHY/QHP: CPT | Performed by: STUDENT IN AN ORGANIZED HEALTH CARE EDUCATION/TRAINING PROGRAM

## 2025-05-15 RX ORDER — NEBULIZER AND COMPRESSOR
1 EACH MISCELLANEOUS 2 TIMES DAILY
Qty: 1 EACH | Refills: 0 | Status: SHIPPED | OUTPATIENT
Start: 2025-05-15

## 2025-05-15 ASSESSMENT — EDINBURGH POSTNATAL DEPRESSION SCALE (EPDS)
TOTAL SCORE: 0
THINGS HAVE BEEN GETTING ON TOP OF ME: NO, I HAVE BEEN COPING AS WELL AS EVER
I HAVE FELT SAD OR MISERABLE: NO, NOT AT ALL
I HAVE BEEN ANXIOUS OR WORRIED FOR NO GOOD REASON: NO, NOT AT ALL
I HAVE LOOKED FORWARD WITH ENJOYMENT TO THINGS: AS MUCH AS I EVER DID
I HAVE BEEN SO UNHAPPY THAT I HAVE BEEN CRYING: NO, NEVER
I HAVE BLAMED MYSELF UNNECESSARILY WHEN THINGS WENT WRONG: NO, NEVER
I HAVE FELT SCARED OR PANICKY FOR NO GOOD REASON: NO, NOT AT ALL
I HAVE BEEN ABLE TO LAUGH AND SEE THE FUNNY SIDE OF THINGS: AS MUCH AS I ALWAYS COULD
I HAVE BEEN SO UNHAPPY THAT I HAVE HAD DIFFICULTY SLEEPING: NOT AT ALL
THE THOUGHT OF HARMING MYSELF HAS OCCURRED TO ME: NEVER

## 2025-05-15 ASSESSMENT — ENCOUNTER SYMPTOMS
LOSS OF SENSATION IN FEET: 0
OCCASIONAL FEELINGS OF UNSTEADINESS: 0

## 2025-05-15 ASSESSMENT — PAIN SCALES - GENERAL: PAINLEVEL_OUTOF10: 0-NO PAIN

## 2025-05-16 ENCOUNTER — TELEPHONE (OUTPATIENT)
Dept: OBSTETRICS AND GYNECOLOGY | Facility: CLINIC | Age: 30
End: 2025-05-16
Payer: OTHER GOVERNMENT

## 2025-05-16 NOTE — TELEPHONE ENCOUNTER
[facility] pharmacy called today requesting patients insurance information, states they need a member ID number. According to our records looks like patients insurance was only for pregnancy. They will reach out to the patient.

## 2025-05-20 ENCOUNTER — APPOINTMENT (OUTPATIENT)
Dept: OBSTETRICS AND GYNECOLOGY | Facility: CLINIC | Age: 30
End: 2025-05-20
Payer: OTHER GOVERNMENT

## 2025-06-16 ENCOUNTER — POSTPARTUM VISIT (OUTPATIENT)
Dept: OBSTETRICS AND GYNECOLOGY | Facility: CLINIC | Age: 30
End: 2025-06-16
Payer: OTHER GOVERNMENT

## 2025-06-16 VITALS
SYSTOLIC BLOOD PRESSURE: 108 MMHG | BODY MASS INDEX: 35.03 KG/M2 | WEIGHT: 220.36 LBS | DIASTOLIC BLOOD PRESSURE: 58 MMHG

## 2025-06-16 DIAGNOSIS — Z98.890 POST-OPERATIVE STATE: Primary | ICD-10-CM

## 2025-06-16 PROBLEM — O14.13 SEVERE PREECLAMPSIA, THIRD TRIMESTER (HHS-HCC): Status: RESOLVED | Noted: 2025-05-06 | Resolved: 2025-06-16

## 2025-06-16 PROBLEM — Z3A.12 12 WEEKS GESTATION OF PREGNANCY (HHS-HCC): Status: RESOLVED | Noted: 2024-11-11 | Resolved: 2025-06-16

## 2025-06-16 PROCEDURE — 99211 OFF/OP EST MAY X REQ PHY/QHP: CPT | Performed by: STUDENT IN AN ORGANIZED HEALTH CARE EDUCATION/TRAINING PROGRAM

## 2025-06-16 ASSESSMENT — PAIN SCALES - GENERAL: PAINLEVEL_OUTOF10: 0-NO PAIN

## 2025-06-16 ASSESSMENT — EDINBURGH POSTNATAL DEPRESSION SCALE (EPDS)
I HAVE BEEN SO UNHAPPY THAT I HAVE HAD DIFFICULTY SLEEPING: NOT AT ALL
I HAVE BLAMED MYSELF UNNECESSARILY WHEN THINGS WENT WRONG: NO, NEVER
THINGS HAVE BEEN GETTING ON TOP OF ME: NO, I HAVE BEEN COPING AS WELL AS EVER
I HAVE FELT SCARED OR PANICKY FOR NO GOOD REASON: NO, NOT AT ALL
THE THOUGHT OF HARMING MYSELF HAS OCCURRED TO ME: NEVER
I HAVE BEEN ABLE TO LAUGH AND SEE THE FUNNY SIDE OF THINGS: AS MUCH AS I ALWAYS COULD
I HAVE BEEN SO UNHAPPY THAT I HAVE BEEN CRYING: NO, NEVER
I HAVE BEEN ANXIOUS OR WORRIED FOR NO GOOD REASON: NO, NOT AT ALL
I HAVE FELT SAD OR MISERABLE: NO, NOT AT ALL

## 2025-06-16 ASSESSMENT — ENCOUNTER SYMPTOMS
DEPRESSION: 0
OCCASIONAL FEELINGS OF UNSTEADINESS: 0
LOSS OF SENSATION IN FEET: 0

## 2025-06-16 NOTE — PROGRESS NOTES
"POSTPARTUM OFFICE VISIT   Subjective     Saleem Jo \"Rebecca\" is a 30 y.o.  who presents for a postpartum visit.  She is status post a PLTCS on 25.     She is doing well since delivery and has resumed normal activities.  She is eating and drinking without issue. She is urinating spontaneously, passing flatus, and having bowel movements. Her pain is well controlled. She is still breast feeding.     She denies headaches, vision changes, CP, SOB, RUQ pain, calf tenderness.      Type of delivery:    PLTCS   Lacerations: N/A  Complications: None  Place of delivery: [facility]  Postpartum course has been: Normal.     Baby's course has been: Normal     Baby is feeding by:    Breast  Baby's name:    Maulik  Baby's weight:   7lb 4oz  Bleeding: No bleeding.  Patient is not sexually active.   Contraception method is: Madisyn IUD, awaiting insurance approval prior to placement.  Postpartum depression screening: Negative.    Objective    /58   Wt 100 kg (220 lb 5.8 oz)   LMP 2024 (Approximate)   Breastfeeding Yes   BMI 35.03 kg/m²     Physical Exam:  General: Alert and oriented, in no acute distress.  Psych: Normal affect and mood. Able to answer questions appropriately.   Lungs: Non labored respirations.  Abdomen: Soft, non-tender, no rebound or guarding.  Uterus: Firm, below the umbilicus, not tender.  Incision: Clean, dry, intact.  Extremities: No calf tenderness bilaterally.  Perineum: Well healed    Assessment/Plan      30 y.o. , s/p PLTCS on 25  Assessment & Plan  Post-operative state  -Meeting postpartum milestones appropriately  -Continue routine postpartum care  -Patient to return for IUD placement       Pre-eclampsia affecting childbirth ([facility])  -BP normal today  -Asymptomatic  -Continue to monitor BP          Alonzo Brunner MD  "

## (undated) DEVICE — SUTURE, MONOCRYL, 4-0, 27 IN, PS-2, UNDYED

## (undated) DEVICE — CAUTERY, PENCIL, PUSH BUTTON, SMOKE EVAC, 70MM

## (undated) DEVICE — PREP TRAY, VAGINAL

## (undated) DEVICE — PAD, GROUNDING, ELECTROSURGICAL, W/9 FT CABLE, POLYHESIVE II, ADULT, LF

## (undated) DEVICE — Device

## (undated) DEVICE — SLEEVE, VASO PRESS, CALF GARMENT, MEDIUM, GREEN

## (undated) DEVICE — SUTURE, VICRYL 0, 36 IN, CT-1, VIOLET

## (undated) DEVICE — GLOVE, SURGICAL, PROTEXIS PI , 6.5, PF, LF